# Patient Record
Sex: MALE | Race: WHITE | NOT HISPANIC OR LATINO | Employment: FULL TIME | URBAN - METROPOLITAN AREA
[De-identification: names, ages, dates, MRNs, and addresses within clinical notes are randomized per-mention and may not be internally consistent; named-entity substitution may affect disease eponyms.]

---

## 2017-03-22 ENCOUNTER — TREATMENT (OUTPATIENT)
Dept: PHYSICAL THERAPY | Facility: CLINIC | Age: 37
End: 2017-03-22

## 2017-03-22 ENCOUNTER — TRANSCRIBE ORDERS (OUTPATIENT)
Dept: PHYSICAL THERAPY | Facility: CLINIC | Age: 37
End: 2017-03-22

## 2017-03-22 DIAGNOSIS — M79.671 RIGHT FOOT PAIN: Primary | ICD-10-CM

## 2017-03-22 DIAGNOSIS — M76.61 ACHILLES TENDINITIS OF RIGHT LOWER EXTREMITY: Primary | ICD-10-CM

## 2017-03-22 PROCEDURE — 97140 MANUAL THERAPY 1/> REGIONS: CPT | Performed by: PHYSICAL THERAPIST

## 2017-03-22 PROCEDURE — 97161 PT EVAL LOW COMPLEX 20 MIN: CPT | Performed by: PHYSICAL THERAPIST

## 2017-03-22 PROCEDURE — 97035 APP MDLTY 1+ULTRASOUND EA 15: CPT | Performed by: PHYSICAL THERAPIST

## 2017-03-24 NOTE — PROGRESS NOTES
Physical Therapy Initial Evaluation and Plan of Care    Patient: Campbell Mcintosh   : 1980  Diagnosis/ICD-10 Code:  Achilles tendinitis of right lower extremity [M76.61]  Referring practitioner: Kareem Nice, *  Date of Initial Visit: 3/22/2017        Subjective Evaluation    History of Present Illness  Mechanism of injury: Patient reports that 2 years ago he was diagnosed with a bone spur and achilles tendon tear on his right ankle.  He reports that his MD did not do much treatment and mostly his pain went away. However he reports that as he has increased his exercise intensity with running and biking he has begun to have more pain.  Patient was to return to do marathons and triathalon's however his pain continues to be present.  He reports that he is frustrated since he has not been given an MRI.    PMHx: hx of ankle sprains. REcent radial head fx on left arm in cast    Quality of life: good    Pain  Current pain ratin  At best pain ratin  At worst pain ratin  Location: right achilles  Quality: cramping, pressure, sharp and tight  Relieving factors: rest and support  Aggravating factors: movement  Progression: no change    Diagnostic Tests  X-ray: abnormal (bone spur)    Patient Goals  Patient goals for therapy: decreased pain, increased motion, return to sport/leisure activities and increased strength             Objective     Palpation     Right Tenderness of the lateral gastrocnemius, medial gastrocnemius and soleus.     Additional Palpation Details  Tightness in calf    Tenderness     Right Ankle/Foot   Tenderness in the Achilles insertion.     Active Range of Motion     Right Ankle/Foot   Dorsiflexion (ke): 10 degrees   Plantar flexion: 45 degrees   Inversion: WFL  Eversion: WFL    Strength/Myotome Testing     Right Ankle/Foot   Dorsiflexion: 4+  Plantar flexion: 4+  Inversion: 4+  Eversion: 4+    Tests      Additional Tests Details  Pain with repeated heel raise single leg         Assessment & Plan     Assessment  Impairments: abnormal or restricted ROM, impaired balance, impaired physical strength, lacks appropriate home exercise program and pain with function  Assessment details: Mr. Mcintosh has had a history of ankle instability due to repeated ankle sprains.  However 2 years ago he began to have more issues due to a bone spur and a possible achilles tear. Patient is very active with both work and outside hobbies and his pain has begun to limit him with those activities. He has a lot of calf tightness but good muscle definition.  Upon palpation patient had a lot of scar tissue formation at the achilles tendon and into the calf muscles.  At this time skilled physical is medically required to reduce his pain and increase ankle ROm and strength to return him to all work duties.   Barriers to therapy: chornic nature of condition  Prognosis: good  Prognosis details: Goals  Short Term Goals (4 weeks)  1. Pt to be independent with HEP  2. Pt to exhibit DF AROM by 10-15 degrees to allow for improved pushoff with gait  3. Pt to report decreased pain at end of  day  4. Pt to exhibit 5 strength to 5/5 to allow for prolonged  walking     Long Term Goals (8 weeks)  1. Pt to exhibit 20 degrees of dorsiflexion to allow for normal gait  2. Pt to report min to no pain with  recreational activities  3. Pt to score 75/80 on LEFS        Plan  Therapy options: will be seen for skilled physical therapy services  Planned modality interventions: cryotherapy, electrical stimulation/Barbadian stimulation, ultrasound and thermotherapy (hydrocollator packs)  Planned therapy interventions: therapeutic activities, stretching, strengthening, soft tissue mobilization, neuromuscular re-education, manual therapy, joint mobilization, home exercise program, gait training, functional ROM exercises, flexibility and balance/weight-bearing  training  Frequency: 2x week  Duration in weeks: 8  Treatment plan discussed with: patient          Manual PT [id number] 15 minutes    Timed Treatment:   23   mins   Total Treatment:     53   mins    PT SIGNATURE: Carley Tillman PT   [place] License # [id number]  DATE TREATMENT INITIATED: 3/24/2017    Initial Certification  Certification Period: 6/22/2017  I certify that the therapy services are furnished while this patient is under my care.  The services outlined above are required by this patient, and will be reviewed every 90 days.     PHYSICIAN: Kareem Nice DPM      DATE:     Please sign and return via fax to [phone].. Thank you, [facility].

## 2017-03-28 ENCOUNTER — TREATMENT (OUTPATIENT)
Dept: PHYSICAL THERAPY | Facility: CLINIC | Age: 37
End: 2017-03-28

## 2017-03-28 DIAGNOSIS — M76.61 ACHILLES TENDINITIS OF RIGHT LOWER EXTREMITY: Primary | ICD-10-CM

## 2017-03-28 PROCEDURE — 97140 MANUAL THERAPY 1/> REGIONS: CPT | Performed by: PHYSICAL THERAPIST

## 2017-03-28 PROCEDURE — 97035 APP MDLTY 1+ULTRASOUND EA 15: CPT | Performed by: PHYSICAL THERAPIST

## 2017-03-28 PROCEDURE — 97110 THERAPEUTIC EXERCISES: CPT | Performed by: PHYSICAL THERAPIST

## 2017-03-29 NOTE — PROGRESS NOTES
Physical Therapy Daily Progress Note      Subjective     Campbell Mcintosh reports: that he is feeling ok.  He reports that he wants to remove his cast.     Pain Scale:  2/10    Objective   See Exercise, Manual, and Modality Logs for complete treatment.       Assessment/Plan  Campbell continues to have a lot of tightness in his calf. I explained to him the importance of stretching at home.  I told him not to remove his cast, or make any adjustments to it.     Progress per Plan of Care           Manual PT [id number] 15 minutes and Therapy Exercise [id number] 15 minutes    Timed Treatment:   38   mins   Total Treatment:     38   mins    Carley Tillman, PT  Physical Therapist  [place] License # [id number]

## 2017-03-30 ENCOUNTER — TREATMENT (OUTPATIENT)
Dept: PHYSICAL THERAPY | Facility: CLINIC | Age: 37
End: 2017-03-30

## 2017-03-30 DIAGNOSIS — M76.61 ACHILLES TENDINITIS OF RIGHT LOWER EXTREMITY: Primary | ICD-10-CM

## 2017-03-30 PROCEDURE — 97140 MANUAL THERAPY 1/> REGIONS: CPT | Performed by: PHYSICAL THERAPIST

## 2017-03-30 PROCEDURE — 97110 THERAPEUTIC EXERCISES: CPT | Performed by: PHYSICAL THERAPIST

## 2017-03-31 NOTE — PROGRESS NOTES
Physical Therapy Daily Progress Note      Subjective     Campbell Mcintosh reports: that his calf is doing well. He reports that he ran for 2miles without any pain but reports that usually his pain does not set in until 6 miles but will try that this weekend.     Pain Scale:  0/10    Objective   See Exercise, Manual, and Modality Logs for complete treatment.       Assessment/Plan  Campbell is doing well with physical therapy with improved calf flexibility.  He still has some scar tissue build up in his calf that we are working on. We will reassess next week after his long run.     Progress per Plan of Care           Manual PT [id number] 25 minutes and Therapy Exercise [id number] 15 minutes    Timed Treatment:   40   mins   Total Treatment:    40   mins    Carley Tillman, PT  Physical Therapist  [place] License # [id number]

## 2017-04-04 ENCOUNTER — TREATMENT (OUTPATIENT)
Dept: PHYSICAL THERAPY | Facility: CLINIC | Age: 37
End: 2017-04-04

## 2017-04-04 DIAGNOSIS — M76.61 ACHILLES TENDINITIS OF RIGHT LOWER EXTREMITY: Primary | ICD-10-CM

## 2017-04-04 PROCEDURE — 97035 APP MDLTY 1+ULTRASOUND EA 15: CPT | Performed by: PHYSICAL THERAPIST

## 2017-04-04 PROCEDURE — 97140 MANUAL THERAPY 1/> REGIONS: CPT | Performed by: PHYSICAL THERAPIST

## 2017-04-05 NOTE — PROGRESS NOTES
Physical Therapy Daily Progress Note  Patient has been seen for 4 sessions    Subjective     Campbell Mcintosh reports: that his heel does not bother him unless he is running long distances >6 miles.  He report he ran about 6 on Sunday with no pain.  He has not had the opportunity to run further due to his cast on his arm.    Pain Scale:  0/10    Objective   See Exercise, Manual, and Modality Logs for complete treatment.       Assessment/Plan  Campbell is doing well but is limited with symptom reproduction due to his cast.  I spoke with him about the possibility of holding off on PT until his cast is removed and he has returned to work. Campbell did not want to do any exercise today.    Progress per Plan of Care           Manual PT [id number] 30 minutes    Timed Treatment:   38   mins   Total Treatment:     38  mins    Carley Tillman, PT  Physical Therapist  [place] License # [id number]

## 2017-04-18 ENCOUNTER — TREATMENT (OUTPATIENT)
Dept: PHYSICAL THERAPY | Facility: CLINIC | Age: 37
End: 2017-04-18

## 2017-04-18 DIAGNOSIS — M76.61 ACHILLES TENDINITIS OF RIGHT LOWER EXTREMITY: Primary | ICD-10-CM

## 2017-04-18 PROCEDURE — 97035 APP MDLTY 1+ULTRASOUND EA 15: CPT | Performed by: PHYSICAL THERAPIST

## 2017-04-18 PROCEDURE — 97140 MANUAL THERAPY 1/> REGIONS: CPT | Performed by: PHYSICAL THERAPIST

## 2017-04-18 PROCEDURE — 97110 THERAPEUTIC EXERCISES: CPT | Performed by: PHYSICAL THERAPIST

## 2017-04-19 NOTE — PROGRESS NOTES
Physical Therapy Daily Progress Note  Patient has been seen for 5 sessions    Subjective     Campbell Mcintosh reports: that he ran 5 miles but didn;t have any pain until the next day but it was very mild.    Pain Scale: 1 /10    Objective   See Exercise, Manual, and Modality Logs for complete treatment.       Assessment/Plan  Campbell has been able to return to running however he continues to have some pain after long distances.     Progress per Plan of Care           Manual PT [id number] 15 minutes and Therapy Exercise [id number] 15 minutes    Timed Treatment:   38   mins   Total Treatment:     38  mins    Carley Tillman, PT  Physical Therapist  [place] License # [id number]

## 2017-05-09 ENCOUNTER — OFFICE VISIT (OUTPATIENT)
Dept: FAMILY MEDICINE CLINIC | Facility: CLINIC | Age: 37
End: 2017-05-09

## 2017-05-09 VITALS
SYSTOLIC BLOOD PRESSURE: 122 MMHG | TEMPERATURE: 97.4 F | OXYGEN SATURATION: 98 % | HEIGHT: 69 IN | DIASTOLIC BLOOD PRESSURE: 60 MMHG | HEART RATE: 46 BPM | BODY MASS INDEX: 29.62 KG/M2 | WEIGHT: 200 LBS

## 2017-05-09 DIAGNOSIS — Z00.00 ROUTINE ADULT HEALTH MAINTENANCE: Primary | ICD-10-CM

## 2017-05-09 LAB
ALBUMIN SERPL-MCNC: 4.9 G/DL (ref 3.5–5.2)
ALBUMIN/GLOB SERPL: 1.7 G/DL
ALP SERPL-CCNC: 41 U/L (ref 40–129)
ALT SERPL-CCNC: 33 U/L (ref 5–41)
APPEARANCE UR: CLEAR
AST SERPL-CCNC: 26 U/L (ref 5–40)
BILIRUB SERPL-MCNC: 1.2 MG/DL (ref 0.2–1.2)
BILIRUB UR QL STRIP: NEGATIVE
BUN SERPL-MCNC: 15 MG/DL (ref 6–20)
BUN/CREAT SERPL: 13 (ref 7–25)
CALCIUM SERPL-MCNC: 9.8 MG/DL (ref 8.6–10.5)
CHLORIDE SERPL-SCNC: 98 MMOL/L (ref 98–107)
CHOLEST SERPL-MCNC: 216 MG/DL (ref 0–200)
CO2 SERPL-SCNC: 28 MMOL/L (ref 22–29)
COLOR UR: YELLOW
CREAT SERPL-MCNC: 1.15 MG/DL (ref 0.76–1.27)
ERYTHROCYTE [DISTWIDTH] IN BLOOD BY AUTOMATED COUNT: 12.2 % (ref 11.5–14.5)
GLOBULIN SER CALC-MCNC: 2.9 GM/DL
GLUCOSE SERPL-MCNC: 75 MG/DL (ref 65–99)
GLUCOSE UR QL: NEGATIVE
HCT VFR BLD AUTO: 45.2 % (ref 42–52)
HDLC SERPL-MCNC: 44 MG/DL (ref 40–60)
HGB BLD-MCNC: 15.5 G/DL (ref 14–18)
HGB UR QL STRIP: NEGATIVE
KETONES UR QL STRIP: NEGATIVE
LDLC SERPL CALC-MCNC: 134 MG/DL (ref 0–100)
LEUKOCYTE ESTERASE UR QL STRIP: NEGATIVE
MCH RBC QN AUTO: 29.4 PG (ref 27–31)
MCHC RBC AUTO-ENTMCNC: 34.3 G/DL (ref 31–37)
MCV RBC AUTO: 85.6 FL (ref 80–94)
NITRITE UR QL STRIP: NEGATIVE
PH UR STRIP: 7 [PH] (ref 4.5–8)
PLATELET # BLD AUTO: 197 10*3/MM3 (ref 140–500)
POTASSIUM SERPL-SCNC: 4.3 MMOL/L (ref 3.5–5.2)
PROT SERPL-MCNC: 7.8 G/DL (ref 6–8.5)
PROT UR QL STRIP: NEGATIVE
RBC # BLD AUTO: 5.28 10*6/MM3 (ref 4.7–6.1)
SODIUM SERPL-SCNC: 140 MMOL/L (ref 136–145)
SP GR UR: 1.01 (ref 1–1.03)
TRIGL SERPL-MCNC: 191 MG/DL (ref 0–150)
UROBILINOGEN UR STRIP-MCNC: NORMAL MG/DL
VLDLC SERPL CALC-MCNC: 38.2 MG/DL (ref 8–32)
WBC # BLD AUTO: 5.22 10*3/MM3 (ref 4.8–10.8)

## 2017-05-09 PROCEDURE — 99385 PREV VISIT NEW AGE 18-39: CPT | Performed by: FAMILY MEDICINE

## 2017-05-09 RX ORDER — MULTIVIT-MIN/FOLIC/VIT K/LYCOP 400-300MCG
TABLET ORAL
COMMUNITY
End: 2022-12-06

## 2017-05-30 ENCOUNTER — DOCUMENTATION (OUTPATIENT)
Dept: PHYSICAL THERAPY | Facility: CLINIC | Age: 37
End: 2017-05-30

## 2017-07-06 ENCOUNTER — OFFICE VISIT (OUTPATIENT)
Dept: FAMILY MEDICINE CLINIC | Facility: CLINIC | Age: 37
End: 2017-07-06

## 2017-07-06 VITALS
TEMPERATURE: 97.9 F | OXYGEN SATURATION: 99 % | DIASTOLIC BLOOD PRESSURE: 70 MMHG | WEIGHT: 194.3 LBS | BODY MASS INDEX: 28.78 KG/M2 | SYSTOLIC BLOOD PRESSURE: 110 MMHG | HEART RATE: 48 BPM | HEIGHT: 69 IN

## 2017-07-06 DIAGNOSIS — M77.02 GOLFERS ELBOW OF LEFT UPPER EXTREMITY: ICD-10-CM

## 2017-07-06 DIAGNOSIS — G47.61 PERIODIC LIMB MOVEMENT: Primary | ICD-10-CM

## 2017-07-06 PROCEDURE — 99213 OFFICE O/P EST LOW 20 MIN: CPT | Performed by: FAMILY MEDICINE

## 2017-07-06 RX ORDER — ROPINIROLE 1 MG/1
1 TABLET, FILM COATED ORAL NIGHTLY
Qty: 30 TABLET | Refills: 5 | Status: SHIPPED | OUTPATIENT
Start: 2017-07-06 | End: 2018-03-09

## 2017-07-06 NOTE — PROGRESS NOTES
"  Subjective   Campbell Mcintosh is a 36 y.o. male who is here for   Chief Complaint   Patient presents with   • Insomnia     getting worse x 1 week    • Fatigue   • Arm Pain     left arm, getting worse past couple of months    .     History of Present Illness   Elbow Pain: Patient complains of left elbow pain. Onset of the symptoms was several years ago. Inciting event: increased use of arm, very active. Current symptoms include point tenderness medial epicondyle. Pain is aggravated by: grasping, lifting heavy objects, supination/pronation as when opening doors. Patient's overall course: symptoms have progressed to a point and plateaued. Patient has had prior elbow problems. Evaluation to date: none. Treatment to date: avoidance of offending activity.      Also poor sleeper  Had a sleep study which reported on MAYRA but \"my arms and legs jerk all around\"  Only gets 4-6 h of sleep a day    The following portions of the patient's history were reviewed and updated as appropriate: allergies, current medications, past family history, past medical history, past social history, past surgical history and problem list.    Review of Systems    Objective   Physical Exam   Constitutional: He appears well-developed and well-nourished.   Cardiovascular: Normal rate.    Pulmonary/Chest: Effort normal.   Musculoskeletal:        Left elbow: He exhibits normal range of motion, no swelling, no effusion and no deformity. Tenderness found. Medial epicondyle tenderness noted. No radial head, no lateral epicondyle and no olecranon process tenderness noted.   Neurological: He is alert.   Psychiatric: He has a normal mood and affect.   Nursing note and vitals reviewed.      Assessment/Plan   Campbell was seen today for insomnia, fatigue and arm pain.    Diagnoses and all orders for this visit:    Periodic limb movement  -     rOPINIRole (REQUIP) 1 MG tablet; Take 1 tablet by mouth Every Night. Take 1 hour before bedtime.  Indications: " Restless Leg Syndrome    Golfers elbow of left upper extremity      Patient Instructions   Golfers elbow HO given      There are no discontinued medications.     Return if symptoms worsen or fail to improve.    Dr. Freddy Talley  [place], [place]

## 2017-10-02 ENCOUNTER — OFFICE VISIT (OUTPATIENT)
Dept: FAMILY MEDICINE CLINIC | Facility: CLINIC | Age: 37
End: 2017-10-02

## 2017-10-02 VITALS
DIASTOLIC BLOOD PRESSURE: 79 MMHG | HEIGHT: 70 IN | RESPIRATION RATE: 18 BRPM | OXYGEN SATURATION: 97 % | SYSTOLIC BLOOD PRESSURE: 114 MMHG | HEART RATE: 84 BPM | BODY MASS INDEX: 26.2 KG/M2 | WEIGHT: 183 LBS

## 2017-10-02 DIAGNOSIS — R19.7 DIARRHEA OF PRESUMED INFECTIOUS ORIGIN: ICD-10-CM

## 2017-10-02 DIAGNOSIS — R11.0 NAUSEA: Primary | ICD-10-CM

## 2017-10-02 PROCEDURE — 99213 OFFICE O/P EST LOW 20 MIN: CPT | Performed by: NURSE PRACTITIONER

## 2017-10-02 RX ORDER — ONDANSETRON 4 MG/1
4 TABLET, ORALLY DISINTEGRATING ORAL EVERY 8 HOURS PRN
Qty: 20 TABLET | Refills: 0 | Status: SHIPPED | OUTPATIENT
Start: 2017-10-02 | End: 2020-08-28

## 2017-10-02 NOTE — PROGRESS NOTES
Subjective   Campbell Mcintosh is a 36 y.o. male.   Chief Complaint   Patient presents with   • Nausea     fever cold chills, vomiting, diarrhea started x 1 day ago     Vitals:    10/02/17 0953   BP: 114/79   Pulse: 84   Resp: 18   SpO2: 97%     Allergies   Allergen Reactions   • Percocet [Oxycodone-Acetaminophen] Nausea And Vomiting       Nausea   This is a new problem. The current episode started in the past 7 days. The problem occurs 2 to 4 times per day. The problem has been gradually improving. Associated symptoms include abdominal pain, chills, fatigue, nausea and vomiting. Pertinent negatives include no fever or sore throat. Associated symptoms comments: Diarrhea, body aches    . Nothing aggravates the symptoms. He has tried nothing for the symptoms.        The following portions of the patient's history were reviewed and updated as appropriate: allergies, current medications, past family history, past medical history, past social history, past surgical history and problem list.    Review of Systems   Constitutional: Positive for chills and fatigue. Negative for fever.   HENT: Negative for sore throat.    Respiratory: Negative.    Cardiovascular: Negative.    Gastrointestinal: Positive for abdominal pain, anal bleeding, diarrhea, nausea and vomiting. Negative for abdominal distention, blood in stool and constipation.   Skin: Negative.    Neurological: Negative.    Psychiatric/Behavioral: Negative.    All other systems reviewed and are negative.      Objective   Physical Exam   Constitutional: He is oriented to person, place, and time. He appears well-developed and well-nourished.   Cardiovascular: Normal rate.    Pulmonary/Chest: Effort normal.   Abdominal: Soft. Bowel sounds are increased. There is no hepatosplenomegaly. There is tenderness in the right lower quadrant, left upper quadrant and left lower quadrant.   Neurological: He is alert and oriented to person, place, and time.   Psychiatric: He has a  normal mood and affect. His behavior is normal. Judgment and thought content normal.   Nursing note and vitals reviewed.      Assessment/Plan   Problems Addressed this Visit     None      Visit Diagnoses     Nausea    -  Primary    Relevant Medications    ondansetron ODT (ZOFRAN ODT) 4 MG disintegrating tablet    Diarrhea of presumed infectious origin            Also  some Imodium AD. Use BRAT diet to reintroduce foods. Avoid dairy, greasy foods, and acidic foods for a few days.

## 2018-03-09 ENCOUNTER — OFFICE VISIT (OUTPATIENT)
Dept: FAMILY MEDICINE CLINIC | Facility: CLINIC | Age: 38
End: 2018-03-09

## 2018-03-09 VITALS
BODY MASS INDEX: 28.59 KG/M2 | HEIGHT: 70 IN | OXYGEN SATURATION: 98 % | WEIGHT: 199.7 LBS | SYSTOLIC BLOOD PRESSURE: 122 MMHG | DIASTOLIC BLOOD PRESSURE: 78 MMHG | HEART RATE: 70 BPM | TEMPERATURE: 98.2 F

## 2018-03-09 DIAGNOSIS — S81.811D LACERATION OF RIGHT LOWER LEG, SUBSEQUENT ENCOUNTER: Primary | ICD-10-CM

## 2018-03-09 PROBLEM — Z48.02 ENCOUNTER FOR REMOVAL OF SUTURES: Status: ACTIVE | Noted: 2018-03-09

## 2018-03-09 PROBLEM — S81.811A LACERATION OF RIGHT LOWER LEG: Status: ACTIVE | Noted: 2018-03-09

## 2018-03-09 PROCEDURE — 99213 OFFICE O/P EST LOW 20 MIN: CPT | Performed by: FAMILY MEDICINE

## 2018-03-09 NOTE — PROGRESS NOTES
Subjective   Campbell Mcintosh is a 37 y.o. male who is here for   Chief Complaint   Patient presents with   • Follow-up     ER   • Fall     knee pain    .     History of Present Illness   Lac to knee  2 layer closure at ER  Notes reviewed.    Knee joint is sore and swollen as well.    Fell and hit knee, upper shin.    TD is utd    The following portions of the patient's history were reviewed and updated as appropriate: allergies, current medications, past family history, past medical history, past social history, past surgical history and problem list.    Review of Systems    Objective   Physical Exam   Cardiovascular: Normal rate and intact distal pulses.    Musculoskeletal:        Legs:  Nursing note and vitals reviewed.      Assessment/Plan   Campbell was seen today for follow-up and fall.    Diagnoses and all orders for this visit:    Laceration of right lower leg, subsequent encounter      There are no Patient Instructions on file for this visit.    Medications Discontinued During This Encounter   Medication Reason   • rOPINIRole (REQUIP) 1 MG tablet *Therapy completed        Return in about 7 days (around 3/16/2018) for wound/suture check.    Dr. Freddy Talley  [facility] [place], [place]

## 2018-03-16 ENCOUNTER — PROCEDURE VISIT (OUTPATIENT)
Dept: FAMILY MEDICINE CLINIC | Facility: CLINIC | Age: 38
End: 2018-03-16

## 2018-03-16 VITALS
DIASTOLIC BLOOD PRESSURE: 90 MMHG | BODY MASS INDEX: 28.18 KG/M2 | SYSTOLIC BLOOD PRESSURE: 128 MMHG | WEIGHT: 196.8 LBS | HEIGHT: 70 IN | OXYGEN SATURATION: 98 % | HEART RATE: 75 BPM

## 2018-03-16 DIAGNOSIS — Z48.02 ENCOUNTER FOR REMOVAL OF SUTURES: ICD-10-CM

## 2018-03-16 DIAGNOSIS — S81.811S LACERATION OF RIGHT LOWER LEG, SEQUELA: Primary | ICD-10-CM

## 2018-03-16 PROCEDURE — 99212 OFFICE O/P EST SF 10 MIN: CPT | Performed by: FAMILY MEDICINE

## 2018-03-16 NOTE — PROGRESS NOTES
Subjective   Campbell Mcintosh is a 37 y.o. male who is here for   Chief Complaint   Patient presents with   • Suture / Staple Removal   .     History of Present Illness   Returns for right shin suture removal.  Would looks clean.      The following portions of the patient's history were reviewed and updated as appropriate: allergies, current medications, past medical history, past social history and problem list.    Review of Systems    Objective   Physical Exam   Skin:        Nursing note and vitals reviewed.          Assessment/Plan   Campbell was seen today for suture / staple removal.    Diagnoses and all orders for this visit:    Laceration of right lower leg, sequela    Encounter for removal of sutures      Patient Instructions   Lets try again next week        There are no discontinued medications.     Return in about 7 days (around 3/23/2018) for wound/suture check.    Dr. Freddy Talley  [facility]  [place], [place]

## 2018-03-23 ENCOUNTER — OFFICE VISIT (OUTPATIENT)
Dept: FAMILY MEDICINE CLINIC | Facility: CLINIC | Age: 38
End: 2018-03-23

## 2018-03-23 VITALS
WEIGHT: 196.8 LBS | OXYGEN SATURATION: 97 % | BODY MASS INDEX: 28.18 KG/M2 | HEIGHT: 70 IN | SYSTOLIC BLOOD PRESSURE: 130 MMHG | HEART RATE: 63 BPM | DIASTOLIC BLOOD PRESSURE: 82 MMHG

## 2018-03-23 DIAGNOSIS — Z48.02 ENCOUNTER FOR REMOVAL OF SUTURES: Primary | ICD-10-CM

## 2018-03-23 DIAGNOSIS — S81.811S LACERATION OF RIGHT LOWER LEG, SEQUELA: ICD-10-CM

## 2018-03-23 PROCEDURE — 99024 POSTOP FOLLOW-UP VISIT: CPT | Performed by: FAMILY MEDICINE

## 2018-03-26 NOTE — PROGRESS NOTES
Procedure   Suture Removal  Date/Time: 3/26/2018 10:01 AM  Performed by: RADHA LICONA  Authorized by: RADHA LICONA   Consent: Verbal consent obtained.  Risks and benefits: risks, benefits and alternatives were discussed  Consent given by: patient  Patient understanding: patient states understanding of the procedure being performed  Patient identity confirmed: verbally with patient  Body area: lower extremity  Location details: right lower leg  Wound Appearance: clean, pink and tender  Sutures Removed: 5  Post-removal: dressing applied  Patient tolerance: Patient tolerated the procedure well with no immediate complications  Comments: We able to remove the interpreted simple sutures along the top  But would is still not healed well and will easily re open  So i left the 2 deeper mattress horizontals in place.  Try next week.

## 2018-03-30 ENCOUNTER — PROCEDURE VISIT (OUTPATIENT)
Dept: FAMILY MEDICINE CLINIC | Facility: CLINIC | Age: 38
End: 2018-03-30

## 2018-03-30 VITALS
DIASTOLIC BLOOD PRESSURE: 94 MMHG | BODY MASS INDEX: 28.06 KG/M2 | OXYGEN SATURATION: 97 % | SYSTOLIC BLOOD PRESSURE: 128 MMHG | HEIGHT: 70 IN | HEART RATE: 54 BPM | WEIGHT: 196 LBS

## 2018-03-30 DIAGNOSIS — S81.811S LACERATION OF RIGHT LOWER LEG, SEQUELA: ICD-10-CM

## 2018-03-30 DIAGNOSIS — Z48.02 ENCOUNTER FOR REMOVAL OF SUTURES: Primary | ICD-10-CM

## 2018-03-30 PROCEDURE — 99212 OFFICE O/P EST SF 10 MIN: CPT | Performed by: FAMILY MEDICINE

## 2018-03-30 NOTE — PROGRESS NOTES
Procedure   Suture Removal  Date/Time: 3/30/2018 2:39 PM  Performed by: RADHA LICONA  Authorized by: RADHA LICONA   Consent: Verbal consent obtained. Written consent not obtained.  Risks and benefits: risks, benefits and alternatives were discussed  Consent given by: patient  Patient understanding: patient states understanding of the procedure being performed  Patient identity confirmed: verbally with patient  Body area: lower extremity  Location details: right lower leg  Wound Appearance: clean  Sutures Removed: 2  Patient tolerance: Patient tolerated the procedure well with no immediate complications  Comments: Josh comes in for a scheduled suture removal  We removed the interupted sutures last week but wound was healed so we left the mattress sutures in x2  Looks much more healed today , so i removed the last 2  Wound stayed together.

## 2019-02-26 ENCOUNTER — OFFICE VISIT (OUTPATIENT)
Dept: FAMILY MEDICINE CLINIC | Facility: CLINIC | Age: 39
End: 2019-02-26

## 2019-02-26 VITALS
HEIGHT: 70 IN | HEART RATE: 77 BPM | SYSTOLIC BLOOD PRESSURE: 118 MMHG | DIASTOLIC BLOOD PRESSURE: 82 MMHG | WEIGHT: 200 LBS | OXYGEN SATURATION: 98 % | BODY MASS INDEX: 28.63 KG/M2

## 2019-02-26 DIAGNOSIS — F51.01 PRIMARY INSOMNIA: ICD-10-CM

## 2019-02-26 DIAGNOSIS — G47.61 PERIODIC LIMB MOVEMENT: Primary | ICD-10-CM

## 2019-02-26 PROBLEM — S81.811A LACERATION OF RIGHT LOWER LEG: Status: RESOLVED | Noted: 2018-03-09 | Resolved: 2019-02-26

## 2019-02-26 PROBLEM — Z48.02 ENCOUNTER FOR REMOVAL OF SUTURES: Status: RESOLVED | Noted: 2018-03-09 | Resolved: 2019-02-26

## 2019-02-26 PROCEDURE — 99213 OFFICE O/P EST LOW 20 MIN: CPT | Performed by: FAMILY MEDICINE

## 2019-02-26 NOTE — PROGRESS NOTES
"  Chief Complaint   Patient presents with   • Nasal Congestion     x 1 week    • Chest Tightness   • Fatigue   • Sore Throat   • Nausea     x 1 day        Upper Respiratory Infection: Patient complains of symptoms of a URI. Symptoms include congestion and cough. Onset of symptoms was a few weeks ago, gradually improving since that time. He also c/o non productive cough for the past 1 week .  He is drinking plenty of fluids. Evaluation to date: none. Treatment to date: cough suppressants.  Ill contacts at home or school or work discussed.  Both wife and daughter had same 2 weeks ago  But overall today his cold is nearly resolved  But still wanted to come in and talk about chronic poor sleep    Poor sleeper since a child  Light sleeper  awakes numerous times a night  un rested in the am  No problem falling asleep.          Vitals:    02/26/19 1334   BP: 118/82   BP Location: Left arm   Patient Position: Sitting   Pulse: 77   SpO2: 98%   Weight: 90.7 kg (200 lb)   Height: 177.8 cm (70\")     Gen: well appearing, alert  Ears: Tm's bulging without redness  Nose:  Congestion  Throat:  without exudate, some drainage, tonsils okay  Neck: no LAD  Lung:  good air movement, regular RR  Heart: RR without murmur  Skin: no rash.      Assessment/Plan   Campbell was seen today for nasal congestion, chest tightness, fatigue, sore throat and nausea.    Diagnoses and all orders for this visit:    Periodic limb movement  -     Suvorexant (BELSOMRA) 10 MG tablet; Take 10 mg by mouth every night at bedtime.  -     Suvorexant (BELSOMRA) 20 MG tablet; Take 20 mg by mouth every night at bedtime.    Primary insomnia  -     Suvorexant (BELSOMRA) 10 MG tablet; Take 10 mg by mouth every night at bedtime.  -     Suvorexant (BELSOMRA) 20 MG tablet; Take 20 mg by mouth every night at bedtime.             There are no Patient Instructions on file for this visit.    Dr. Freddy Talley MD  [place], [place]  [facility] " Group

## 2019-05-10 ENCOUNTER — OFFICE VISIT (OUTPATIENT)
Dept: FAMILY MEDICINE CLINIC | Facility: CLINIC | Age: 39
End: 2019-05-10

## 2019-05-10 VITALS
DIASTOLIC BLOOD PRESSURE: 96 MMHG | SYSTOLIC BLOOD PRESSURE: 142 MMHG | BODY MASS INDEX: 28.77 KG/M2 | HEART RATE: 64 BPM | HEIGHT: 70 IN | OXYGEN SATURATION: 96 % | WEIGHT: 201 LBS

## 2019-05-10 DIAGNOSIS — G47.61 PERIODIC LIMB MOVEMENT: Primary | ICD-10-CM

## 2019-05-10 DIAGNOSIS — F51.01 PRIMARY INSOMNIA: ICD-10-CM

## 2019-05-10 PROCEDURE — 99213 OFFICE O/P EST LOW 20 MIN: CPT | Performed by: FAMILY MEDICINE

## 2019-05-10 NOTE — PROGRESS NOTES
Subjective   Campbell Mcintosh is a 38 y.o. male who is here for   Chief Complaint   Patient presents with   • Insomnia     Follow Up  did not care for Belsomra   .     History of Present Illness   Tried the Belsomra  Worked ok  But he does not have 8 hours to sleep  Goes to bed at 11 pm wakes up at 4 am.  Had am hangover    The following portions of the patient's history were reviewed and updated as appropriate: allergies, current medications, past family history, past medical history, past social history and problem list.    Review of Systems    Objective   Physical Exam   Constitutional: He appears well-developed and well-nourished.   Pulmonary/Chest: Effort normal.   Neurological: He is alert.   Psychiatric: He has a normal mood and affect.   Nursing note and vitals reviewed.      Assessment/Plan   Campbell was seen today for insomnia.    Diagnoses and all orders for this visit:    Periodic limb movement    Primary insomnia      There are no Patient Instructions on file for this visit.    Medications Discontinued During This Encounter   Medication Reason   • Suvorexant (BELSOMRA) 10 MG tablet Not Efficacious   • Suvorexant (BELSOMRA) 20 MG tablet Not Efficacious        Return in about 1 year (around 5/10/2020) for Annual physical.    Dr. Freddy Talley  [place], [place]

## 2020-05-15 ENCOUNTER — TELEMEDICINE (OUTPATIENT)
Dept: FAMILY MEDICINE CLINIC | Facility: CLINIC | Age: 40
End: 2020-05-15

## 2020-05-15 DIAGNOSIS — R53.82 CHRONIC FATIGUE: Primary | ICD-10-CM

## 2020-05-15 DIAGNOSIS — E55.9 VITAMIN D INSUFFICIENCY: ICD-10-CM

## 2020-05-15 DIAGNOSIS — R11.0 NAUSEA: ICD-10-CM

## 2020-05-15 DIAGNOSIS — Z00.00 ROUTINE ADULT HEALTH MAINTENANCE: ICD-10-CM

## 2020-05-15 PROCEDURE — 99213 OFFICE O/P EST LOW 20 MIN: CPT | Performed by: FAMILY MEDICINE

## 2020-05-15 NOTE — PROGRESS NOTES
Subjective   Campbell Mcintosh is a 39 y.o. male who is here for   Chief Complaint   Patient presents with   • Fatigue   .     History of Present Illness   This is a Mychart Video medical encounter, occurring during the coronavirus COVID-19 pandemic of 2020.  Medical history from chart is reviewed.  Total face video time: 9. Total chart time:15    Fatigue: Patient complains of fatigue. Symptoms began several months ago. Sentinal symptom the patient feels fatigue began with: none. Symptoms of his fatigue have been diffuse soft tissue aches and pains, fatigue with paradoxical insomnia, headaches and lack of interest in usual activities. Patient describes the following psychologic symptoms: stress at work.  Patient denies fever, significant change in weight, symptoms of arthritis, exercise intolerance, unusual rashes, cold intolerance, constipation and change in hair texture., GI blood loss and witnessed or suspected sleep apnea. Symptoms have progressed to a point and plateaued. Severity has been symptoms bothersome, but easily able to carry out all usual work/school/family activities. Previous visits for this problem: none.   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    Would like to add on allergy panel to upcoming routine lab work  Due to seasonal nasal congestion and stomach ache after certain foods        The following portions of the patient's history were reviewed and updated as appropriate: allergies, current medications, past family history, past medical history, past social history, past surgical history and problem list.    Review of Systems   Constitutional: Negative for fever.   Respiratory: Negative for shortness of breath.    Cardiovascular: Negative for chest pain.   Gastrointestinal: Negative.    Psychiatric/Behavioral: Positive for sleep disturbance.       Objective   Physical Exam   Constitutional: No distress.   Pulmonary/Chest: No respiratory distress.   Neurological: He is alert.    Psychiatric: He has a normal mood and affect.       Assessment/Plan   Campbell was seen today for fatigue.    Diagnoses and all orders for this visit:    Chronic fatigue  -     Allergens, Zone 5; Future  -     CBC (No Diff); Future  -     Comprehensive Metabolic Panel; Future  -     Lipid Panel; Future  -     TSH; Future  -     Urinalysis With Microscopic If Indicated (No Culture) - Urine, Clean Catch; Future  -     Vitamin D 25 Hydroxy; Future  -     Allergen Food Panel; Future    Nausea  -     Comprehensive Metabolic Panel; Future  -     Allergen Food Panel; Future    Routine adult health maintenance  -     CBC (No Diff); Future  -     Comprehensive Metabolic Panel; Future  -     Lipid Panel; Future  -     TSH; Future  -     Urinalysis With Microscopic If Indicated (No Culture) - Urine, Clean Catch; Future  -     Vitamin D 25 Hydroxy; Future    Vitamin D insufficiency  -     Vitamin D 25 Hydroxy; Future      There are no Patient Instructions on file for this visit.    There are no discontinued medications.     No follow-ups on file.    Dr. Freddy Talley  [place], [place]

## 2020-05-22 ENCOUNTER — RESULTS ENCOUNTER (OUTPATIENT)
Dept: FAMILY MEDICINE CLINIC | Facility: CLINIC | Age: 40
End: 2020-05-22

## 2020-05-22 DIAGNOSIS — Z00.00 ROUTINE ADULT HEALTH MAINTENANCE: ICD-10-CM

## 2020-05-22 DIAGNOSIS — E55.9 VITAMIN D INSUFFICIENCY: ICD-10-CM

## 2020-05-22 DIAGNOSIS — R11.0 NAUSEA: ICD-10-CM

## 2020-05-22 DIAGNOSIS — R53.82 CHRONIC FATIGUE: ICD-10-CM

## 2020-05-28 ENCOUNTER — HOSPITAL ENCOUNTER (EMERGENCY)
Facility: HOSPITAL | Age: 40
Discharge: HOME | End: 2020-05-28
Attending: EMERGENCY MEDICINE | Admitting: EMERGENCY MEDICINE

## 2020-05-28 VITALS
RESPIRATION RATE: 18 BRPM | BODY MASS INDEX: 27.92 KG/M2 | HEIGHT: 70 IN | OXYGEN SATURATION: 99 % | HEART RATE: 67 BPM | SYSTOLIC BLOOD PRESSURE: 137 MMHG | WEIGHT: 195 LBS | TEMPERATURE: 97.8 F | DIASTOLIC BLOOD PRESSURE: 99 MMHG

## 2020-05-28 DIAGNOSIS — S00.05XA FOREIGN BODY OF SKIN OF SCALP, INITIAL ENCOUNTER: Primary | ICD-10-CM

## 2020-05-28 PROCEDURE — 10120 INC&RMVL FB SUBQ TISS SMPL: CPT | Performed by: PHYSICIAN ASSISTANT

## 2020-05-28 PROCEDURE — 99282 EMERGENCY DEPT VISIT SF MDM: CPT

## 2020-05-28 RX ORDER — LIDOCAINE HYDROCHLORIDE AND EPINEPHRINE 10; 10 MG/ML; UG/ML
10 INJECTION, SOLUTION INFILTRATION; PERINEURAL ONCE
Status: COMPLETED | OUTPATIENT
Start: 2020-05-28 | End: 2020-05-28

## 2020-05-28 RX ADMIN — LIDOCAINE HYDROCHLORIDE,EPINEPHRINE BITARTRATE 10 ML: 10; .01 INJECTION, SOLUTION INFILTRATION; PERINEURAL at 17:17

## 2020-08-03 ENCOUNTER — TELEPHONE (OUTPATIENT)
Dept: FAMILY MEDICINE CLINIC | Facility: CLINIC | Age: 40
End: 2020-08-03

## 2020-08-10 LAB
25(OH)D3+25(OH)D2 SERPL-MCNC: 36.9 NG/ML (ref 30–100)
A ALTERNATA IGE QN: <0.1 KU/L
A FUMIGATUS IGE QN: <0.1 KU/L
ALBUMIN SERPL-MCNC: 5 G/DL (ref 3.5–5.2)
ALBUMIN/GLOB SERPL: 2.6 G/DL
ALP SERPL-CCNC: 41 U/L (ref 39–117)
ALT SERPL-CCNC: 25 U/L (ref 1–41)
AMER ROACH IGE QN: <0.1 KU/L
APPEARANCE UR: CLEAR
AST SERPL-CCNC: 21 U/L (ref 1–40)
BAHIA GRASS IGE QN: <0.1 KU/L
BARLEY IGE QN: <0.1 KU/L
BAYBERRY POLN IGE QN: <0.1 KU/L
BEEF IGE QN: <0.1 KU/L
BERMUDA GRASS IGE QN: <0.1 KU/L
BILIRUB SERPL-MCNC: 1 MG/DL (ref 0–1.2)
BILIRUB UR QL STRIP: NEGATIVE
BOXELDER IGE QN: <0.1 KU/L
BUN SERPL-MCNC: 18 MG/DL (ref 6–20)
BUN/CREAT SERPL: 15.7 (ref 7–25)
C HERBARUM IGE QN: <0.1 KU/L
CABBAGE IGE QN: <0.1 KU/L
CALCIUM SERPL-MCNC: 9.2 MG/DL (ref 8.6–10.5)
CARROT IGE QN: <0.1 KU/L
CAT DANDER IGE QN: <0.1 KU/L
CHICKEN MEAT IGE QN: <0.1 KU/L
CHLORIDE SERPL-SCNC: 103 MMOL/L (ref 98–107)
CHOLEST SERPL-MCNC: 189 MG/DL (ref 0–200)
CO2 SERPL-SCNC: 26.7 MMOL/L (ref 22–29)
CODFISH IGE QN: <0.1 KU/L
COLOR UR: YELLOW
COMMON RAGWEED IGE QN: <0.1 KU/L
CONV CLASS DESCRIPTION: NORMAL
CORN IGE QN: <0.1 KU/L
COW MILK IGE QN: <0.1 KU/L
CRAB IGE QN: <0.1 KU/L
CREAT SERPL-MCNC: 1.15 MG/DL (ref 0.76–1.27)
D FARINAE IGE QN: <0.1 KU/L
D PTERONYSS IGE QN: <0.1 KU/L
DOG DANDER IGE QN: <0.1 KU/L
DOG FENNEL IGE QN: <0.1 KU/L
EGG WHITE IGE QN: <0.1 KU/L
ENGL PLANTAIN IGE QN: <0.1 KU/L
ERYTHROCYTE [DISTWIDTH] IN BLOOD BY AUTOMATED COUNT: 12.9 % (ref 12.3–15.4)
GLOBULIN SER CALC-MCNC: 1.9 GM/DL
GLUCOSE SERPL-MCNC: 88 MG/DL (ref 65–99)
GLUCOSE UR QL: NEGATIVE
GOOSEFOOT IGE QN: <0.1 KU/L
GRAPE IGE QN: <0.1 KU/L
GREEN PEPPER IGE QN: <0.1 KU/L
GUM-TREE IGE QN: <0.1 KU/L
HCT VFR BLD AUTO: 44.2 % (ref 37.5–51)
HDLC SERPL-MCNC: 43 MG/DL (ref 40–60)
HGB BLD-MCNC: 14.7 G/DL (ref 13–17.7)
HGB UR QL STRIP: NEGATIVE
ITALIAN CYPRESS IGE QN: <0.1 KU/L
JOHNSON GRASS IGE QN: <0.1 KU/L
KETONES UR QL STRIP: NEGATIVE
LDLC SERPL CALC-MCNC: 121 MG/DL (ref 0–100)
LETTUCE IGE QN: <0.1 KU/L
LEUKOCYTE ESTERASE UR QL STRIP: NEGATIVE
M RACEMOSUS IGE QN: <0.1 KU/L
MCH RBC QN AUTO: 30.2 PG (ref 26.6–33)
MCHC RBC AUTO-ENTMCNC: 33.3 G/DL (ref 31.5–35.7)
MCV RBC AUTO: 90.9 FL (ref 79–97)
NITRITE UR QL STRIP: NEGATIVE
OAT IGE QN: <0.1 KU/L
ORANGE IGE QN: <0.1 KU/L
P NOTATUM IGE QN: <0.1 KU/L
PEANUT IGE QN: <0.1 KU/L
PEPPER TREE IGE QN: <0.1 KU/L
PER RYE GRASS IGE QN: <0.1 KU/L
PH UR STRIP: 6 [PH] (ref 5–8)
PLATELET # BLD AUTO: 168 10*3/MM3 (ref 140–450)
PORK IGE QN: <0.1 KU/L
POTASSIUM SERPL-SCNC: 4.1 MMOL/L (ref 3.5–5.2)
POTATO IGE QN: <0.1 KU/L
PRIVET IGE QN: <0.1 KU/L
PROT SERPL-MCNC: 6.9 G/DL (ref 6–8.5)
PROT UR QL STRIP: NEGATIVE
QUEEN PALM IGE QN: <0.1 KU/L
RBC # BLD AUTO: 4.86 10*6/MM3 (ref 4.14–5.8)
RICE IGE QN: <0.1 KU/L
RYE IGE QN: <0.1 KU/L
S BOTRYOSUM IGE QN: <0.1 KU/L
SHEEP SORREL IGE QN: <0.1 KU/L
SHRIMP IGE QN: <0.1 KU/L
SODIUM SERPL-SCNC: 140 MMOL/L (ref 136–145)
SOYBEAN IGE QN: <0.1 KU/L
SP GR UR: 1.02 (ref 1–1.03)
TOMATO IGE QN: <0.1 KU/L
TRIGL SERPL-MCNC: 124 MG/DL (ref 0–150)
TSH SERPL DL<=0.005 MIU/L-ACNC: 1.61 UIU/ML (ref 0.27–4.2)
TUNA IGE QN: <0.1 KU/L
UROBILINOGEN UR STRIP-MCNC: NORMAL MG/DL
VIRG LIVE OAK IGE QN: <0.1 KU/L
VLDLC SERPL CALC-MCNC: 24.8 MG/DL
WBC # BLD AUTO: 4.05 10*3/MM3 (ref 3.4–10.8)
WHEAT IGE QN: <0.1 KU/L
WHITE BEAN IGE QN: <0.1 KU/L
WHITE ELM IGE QN: <0.1 KU/L

## 2020-08-28 ENCOUNTER — OFFICE VISIT (OUTPATIENT)
Dept: FAMILY MEDICINE CLINIC | Facility: CLINIC | Age: 40
End: 2020-08-28

## 2020-08-28 VITALS
DIASTOLIC BLOOD PRESSURE: 72 MMHG | OXYGEN SATURATION: 99 % | HEIGHT: 70 IN | WEIGHT: 202 LBS | BODY MASS INDEX: 28.92 KG/M2 | HEART RATE: 60 BPM | RESPIRATION RATE: 16 BRPM | SYSTOLIC BLOOD PRESSURE: 120 MMHG

## 2020-08-28 DIAGNOSIS — Z00.00 ROUTINE ADULT HEALTH MAINTENANCE: Primary | ICD-10-CM

## 2020-08-28 PROCEDURE — 99395 PREV VISIT EST AGE 18-39: CPT | Performed by: FAMILY MEDICINE

## 2020-08-28 RX ORDER — BIOTIN 5 MG
1 TABLET ORAL EVERY MORNING
COMMUNITY
End: 2022-12-06

## 2020-08-28 NOTE — PROGRESS NOTES
"  Chief Complaint   Patient presents with   • Annual Exam       Subjective   Campbell Mcintosh is a 39 y.o. male and is here for a yearly physical exam. The patient reports no problems.    Do you take any herbs or supplements that were not prescribed by a doctor? yes. If so, these will be added to active medication list.    The following portions of the patient's history were reviewed and updated as appropriate: allergies, current medications, past family history, past medical history, past social history, past surgical history and problem list.    Social and Family and Surgical History reviewed and updated today, see Rooming tab.    Health History, Preventive Measures and Vaccination flow sheets reviewed and updated today.    Patient's current medical chart in Epic; including previous office notes, Mychart messages, recent phone calls, imaging, labs, specialist's evaluation either in notes or in Media tab reviewed today.    Other outside pertinent medical information also reviewed thru Care Everywhere function is also reviewed today.    Review of Systems  Review of Systems  A comprehensive review of systems was negative.    Vitals:    08/28/20 0917 08/28/20 0936   BP: 120/72    BP Location: Left arm    Patient Position: Sitting    Cuff Size: Adult    Pulse: (!) 43 60   Resp:  16   SpO2: 99%    Weight: 91.6 kg (202 lb)    Height: 177.8 cm (70\")        General Appearance:  Alert, cooperative, no distress, appears stated age   Head:  Normocephalic, without obvious abnormality, atraumatic   Eyes:  PERRL, conjunctiva/corneas clear, EOM's intact.   Ears:  Normal TM's and external ear canals, both ears   Nose: Nares normal, septum midline, mucosa normal, no drainage or sinus tenderness   Throat: Lips, mucosa, and tongue normal; teeth and gums normal   Neck: Supple, symmetrical, trachea midline, no adenopathy;   thyroid: No enlargement/tenderness/nodules; no carotid  bruit   Back:  Symmetric, no curvature, ROM normal, no " CVA tenderness   Lungs:  Clear to auscultation bilaterally, respirations unlabored   Chest wall:  No tenderness or deformity   Heart:  Regular rate and rhythm, S1 and S2 normal, no murmur, rub or gallop   Abdomen:  Soft, non-tender, bowel sounds active all four quadrants,   no masses, no organomegaly   Rectal:        Extremities: Extremities normal, atraumatic, no cyanosis or edema   Pulses: 2+ and symmetric all extremities   Skin: Skin color, texture, turgor normal, no rashes or lesions   Lymph nodes: Cervical, supraclavicular, and axillary nodes normal   Neurologic: CNII-XII intact. Normal strength, sensation and reflexes   throughout          Results for orders placed or performed in visit on 05/22/20   Allergens, Zone 5   Result Value Ref Range    Class Description Comment     D. pteronyssinus (dust mite) <0.10 Class 0 kU/L    D. farinae (dust mite) <0.10 Class 0 kU/L    Cat Dander <0.10 Class 0 kU/L    Dog Dander, IgE <0.10 Class 0 kU/L    Bermuda Grass <0.10 Class 0 kU/L    Forks, Perennial <0.10 Class 0 kU/L    Britton Grass <0.10 Class 0 kU/L    Bahia Grass <0.10 Class 0 kU/L    Cockroach, American <0.10 Class 0 kU/L    Penicillium chrysogen <0.10 Class 0 kU/L    Cladosporium herbarum <0.10 Class 0 kU/L    Aspergillus fumigatus <0.10 Class 0 kU/L    Mucor racemosus <0.10 Class 0 kU/L    Alternaria alternata <0.10 Class 0 kU/L    Stemphylium herbarum <0.10 Class 0 kU/L    Elm, American <0.10 Class 0 kU/L    Eucalyptus <0.10 Class 0 kU/L    Maple/Vermilion <0.10 Class 0 kU/L    Ingraham, Italian <0.10 Class 0 kU/L    Pepper Tree <0.10 Class 0 kU/L    Putnam, Live/[place] <0.10 Class 0 kU/L    Privet, Common <0.10 Class 0 kU/L    Bayberry <0.10 Class 0 kU/L    Cisneros Palm <0.10 Class 0 kU/L    Ragweed, Common/Short <0.10 Class 0 kU/L    English Plantain <0.10 Class 0 kU/L    Lamb's Quarter <0.10 Class 0 kU/L    Sheep Sorrel <0.10 Class 0 kU/L    Dog Fennel <0.10 Class 0 kU/L   CBC (No Diff)   Result Value Ref Range     WBC 4.05 3.40 - 10.80 10*3/mm3    RBC 4.86 4.14 - 5.80 10*6/mm3    Hemoglobin 14.7 13.0 - 17.7 g/dL    Hematocrit 44.2 37.5 - 51.0 %    MCV 90.9 79.0 - 97.0 fL    MCH 30.2 26.6 - 33.0 pg    MCHC 33.3 31.5 - 35.7 g/dL    RDW 12.9 12.3 - 15.4 %    Platelets 168 140 - 450 10*3/mm3   Comprehensive Metabolic Panel   Result Value Ref Range    Glucose 88 65 - 99 mg/dL    BUN 18 6 - 20 mg/dL    Creatinine 1.15 0.76 - 1.27 mg/dL    eGFR Non African Am 71 >60 mL/min/1.73    eGFR African Am 86 >60 mL/min/1.73    BUN/Creatinine Ratio 15.7 7.0 - 25.0    Sodium 140 136 - 145 mmol/L    Potassium 4.1 3.5 - 5.2 mmol/L    Chloride 103 98 - 107 mmol/L    Total CO2 26.7 22.0 - 29.0 mmol/L    Calcium 9.2 8.6 - 10.5 mg/dL    Total Protein 6.9 6.0 - 8.5 g/dL    Albumin 5.00 3.50 - 5.20 g/dL    Globulin 1.9 gm/dL    A/G Ratio 2.6 g/dL    Total Bilirubin 1.0 0.0 - 1.2 mg/dL    Alkaline Phosphatase 41 39 - 117 U/L    AST (SGOT) 21 1 - 40 U/L    ALT (SGPT) 25 1 - 41 U/L   Lipid Panel   Result Value Ref Range    Total Cholesterol 189 0 - 200 mg/dL    Triglycerides 124 0 - 150 mg/dL    HDL Cholesterol 43 40 - 60 mg/dL    VLDL Cholesterol 24.8 mg/dL    LDL Cholesterol  121 (H) 0 - 100 mg/dL   TSH   Result Value Ref Range    TSH 1.610 0.270 - 4.200 uIU/mL   Urinalysis With Microscopic If Indicated (No Culture) - Urine, Clean Catch   Result Value Ref Range    Specific Gravity, UA 1.024 1.005 - 1.030    pH, UA 6.0 5.0 - 8.0    Color, UA Yellow     Appearance, UA Clear Clear    Leukocytes, UA Negative Negative    Protein Negative Negative    Glucose, UA Negative Negative    Ketones Negative Negative    Blood, UA Negative Negative    Bilirubin, UA Negative Negative    Urobilinogen, UA Comment     Nitrite, UA Negative Negative   Vitamin D 25 Hydroxy   Result Value Ref Range    25 Hydroxy, Vitamin D 36.9 30.0 - 100.0 ng/ml   Allergen Food Panel   Result Value Ref Range    Egg White <0.10 Class 0 kU/L    Milk, Cow's <0.10 Class 0 kU/L    CodFish  <0.10 Class 0 kU/L    Wheat <0.10 Class 0 kU/L    Rye <0.10 Class 0 kU/L    Barley <0.10 Class 0 kU/L    Oats <0.10 Class 0 kU/L    Corn <0.10 Class 0 kU/L    Rice <0.10 Class 0 kU/L    Peanut <0.10 Class 0 kU/L    Soybean <0.10 Class 0 kU/L    White Parada <0.10 Class 0 kU/L    Crab <0.10 Class 0 kU/L    Shrimp <0.10 Class 0 kU/L    Tomato <0.10 Class 0 kU/L    Pork <0.10 Class 0 kU/L    Beef <0.10 Class 0 kU/L    Carrot <0.10 Class 0 kU/L    Orange <0.10 Class 0 kU/L    Potato <0.10 Class 0 kU/L    Tuna <0.10 Class 0 kU/L    Chicken <0.10 Class 0 kU/L    Green Bell Pepper <0.10 Class 0 kU/L    Lettuce <0.10 Class 0 kU/L    Cabbage <0.10 Class 0 kU/L    Grape <0.10 Class 0 kU/L     Assessment/Plan   Healthy male exam.  Campbell was seen today for annual exam.    Diagnoses and all orders for this visit:    Routine adult health maintenance  -     CBC (No Diff); Future  -     Comprehensive Metabolic Panel; Future  -     Lipid Panel; Future  -     Urinalysis With Microscopic If Indicated (No Culture) - Urine, Clean Catch; Future      1. Labs all ok    Has upcoming right elbow surgery with [employer]    2. Patient Counseling:  --Nutrition: Stressed importance of moderation in sodium/caffeine intake, saturated fat and cholesterol.  Discussed caloric balance, sufficient intake of fresh fruits, vegetables, fiber, calcium, iron.  --Exercise: Stressed the importance of regular exercise.   --Substance Abuse: Discussed cessation/primary prevention of tobacco, alcohol, or other drug use; driving or other dangerous activities under the influence.    --Dental health: Discussed importance of regular tooth brushing, flossing, and dental visits.  --Suggested having eyes and vision checked if needed or past due.  --Immunizations reviewed and given if needed.  --  3. Discussed the patient's BMI with him.  The BMI is in the acceptable range  4. Follow up in one year    There are no Patient Instructions on file for this visit.    Medications  Discontinued During This Encounter   Medication Reason   • ondansetron ODT (ZOFRAN ODT) 4 MG disintegrating tablet *Therapy completed        Dr. Freddy Talley MD  [place], [place]  [facility]

## 2021-08-20 DIAGNOSIS — Z00.00 ROUTINE ADULT HEALTH MAINTENANCE: ICD-10-CM

## 2021-09-18 LAB
ALBUMIN SERPL-MCNC: 5.1 G/DL (ref 3.5–5.2)
ALBUMIN/GLOB SERPL: 2.4 G/DL
ALP SERPL-CCNC: 36 U/L (ref 39–117)
ALT SERPL-CCNC: 57 U/L (ref 1–41)
APPEARANCE UR: CLEAR
AST SERPL-CCNC: 32 U/L (ref 1–40)
BILIRUB SERPL-MCNC: 0.7 MG/DL (ref 0–1.2)
BILIRUB UR QL STRIP: NEGATIVE
BUN SERPL-MCNC: 14 MG/DL (ref 6–20)
BUN/CREAT SERPL: 12.4 (ref 7–25)
CALCIUM SERPL-MCNC: 9.8 MG/DL (ref 8.6–10.5)
CHLORIDE SERPL-SCNC: 101 MMOL/L (ref 98–107)
CHOLEST SERPL-MCNC: 172 MG/DL (ref 0–200)
CO2 SERPL-SCNC: 27.9 MMOL/L (ref 22–29)
COLOR UR: YELLOW
CREAT SERPL-MCNC: 1.13 MG/DL (ref 0.76–1.27)
ERYTHROCYTE [DISTWIDTH] IN BLOOD BY AUTOMATED COUNT: 12.5 % (ref 12.3–15.4)
GLOBULIN SER CALC-MCNC: 2.1 GM/DL
GLUCOSE SERPL-MCNC: 89 MG/DL (ref 65–99)
GLUCOSE UR QL: NEGATIVE
HCT VFR BLD AUTO: 46 % (ref 37.5–51)
HDLC SERPL-MCNC: 40 MG/DL (ref 40–60)
HGB BLD-MCNC: 14.9 G/DL (ref 13–17.7)
HGB UR QL STRIP: NEGATIVE
KETONES UR QL STRIP: NEGATIVE
LDLC SERPL CALC-MCNC: 116 MG/DL (ref 0–100)
LEUKOCYTE ESTERASE UR QL STRIP: NEGATIVE
MCH RBC QN AUTO: 29.7 PG (ref 26.6–33)
MCHC RBC AUTO-ENTMCNC: 32.4 G/DL (ref 31.5–35.7)
MCV RBC AUTO: 91.8 FL (ref 79–97)
NITRITE UR QL STRIP: NEGATIVE
PH UR STRIP: 7.5 [PH] (ref 5–8)
PLATELET # BLD AUTO: 204 10*3/MM3 (ref 140–450)
POTASSIUM SERPL-SCNC: 4.4 MMOL/L (ref 3.5–5.2)
PROT SERPL-MCNC: 7.2 G/DL (ref 6–8.5)
PROT UR QL STRIP: NEGATIVE
RBC # BLD AUTO: 5.01 10*6/MM3 (ref 4.14–5.8)
SODIUM SERPL-SCNC: 137 MMOL/L (ref 136–145)
SP GR UR: 1.02 (ref 1–1.03)
TRIGL SERPL-MCNC: 88 MG/DL (ref 0–150)
UROBILINOGEN UR STRIP-MCNC: NORMAL MG/DL
VLDLC SERPL CALC-MCNC: 16 MG/DL (ref 5–40)
WBC # BLD AUTO: 4.25 10*3/MM3 (ref 3.4–10.8)

## 2021-09-24 ENCOUNTER — OFFICE VISIT (OUTPATIENT)
Dept: FAMILY MEDICINE CLINIC | Facility: CLINIC | Age: 41
End: 2021-09-24

## 2021-09-24 VITALS
OXYGEN SATURATION: 99 % | WEIGHT: 201 LBS | SYSTOLIC BLOOD PRESSURE: 120 MMHG | HEART RATE: 53 BPM | HEIGHT: 70 IN | DIASTOLIC BLOOD PRESSURE: 70 MMHG | BODY MASS INDEX: 28.77 KG/M2

## 2021-09-24 DIAGNOSIS — Z00.00 ROUTINE ADULT HEALTH MAINTENANCE: Primary | ICD-10-CM

## 2021-09-24 PROBLEM — G57.61 MORTON NEUROMA, RIGHT: Status: ACTIVE | Noted: 2021-09-24

## 2021-09-24 PROCEDURE — 99396 PREV VISIT EST AGE 40-64: CPT | Performed by: FAMILY MEDICINE

## 2021-09-24 NOTE — PROGRESS NOTES
"  Chief Complaint   Patient presents with   • Annual Exam       Subjective   Campbell Mcintosh is a 40 y.o. male and is here for a yearly physical exam. The patient reports no problems.    Do you take any herbs or supplements that were not prescribed by a doctor? yes. If so, these will be added to active medication list.    The following portions of the patient's history were reviewed and updated as appropriate: allergies, current medications, past family history, past medical history, past social history, past surgical history and problem list.    Social and Family and Surgical History reviewed and updated today, see Rooming tab.    Health History, Preventive Measures and Vaccination flow sheets reviewed and updated today.    Patient's current medical chart in Epic; including previous office notes, Mychart messages, recent phone calls, imaging, labs, specialist's evaluation either in notes or in Media tab reviewed today.    Other outside pertinent medical information also reviewed thru Care Everywhere function is also reviewed today.    Review of Systems  Review of Systems  A comprehensive review of systems was negative.    Vitals:    09/24/21 1034   BP: 120/70   BP Location: Left arm   Patient Position: Sitting   Cuff Size: Adult   Pulse: 53   SpO2: 99%   Weight: 91.2 kg (201 lb)   Height: 177.8 cm (70\")       General Appearance:  Alert, cooperative, no distress, appears stated age   Head:  Normocephalic, without obvious abnormality, atraumatic   Eyes:  PERRL, conjunctiva/corneas clear, EOM's intact.   Ears:  Normal TM's and external ear canals, both ears   Nose: Nares normal, septum midline, mucosa normal, no drainage or sinus tenderness   Throat: Lips, mucosa, and tongue normal; teeth and gums normal   Neck: Supple, symmetrical, trachea midline, no adenopathy;   thyroid: No enlargement/tenderness/nodules; no carotid  bruit   Back:  Symmetric, no curvature, ROM normal, no CVA tenderness   Lungs:  Clear to " auscultation bilaterally, respirations unlabored   Chest wall:  No tenderness or deformity   Heart:  Regular rate and rhythm, S1 and S2 normal, no murmur, rub or gallop   Abdomen:  Soft, non-tender, bowel sounds active all four quadrants,   no masses, no organomegaly   Rectal:        Extremities: Extremities normal, atraumatic, no cyanosis or edema   Pulses: 2+ and symmetric all extremities   Skin: Skin color, texture, turgor normal, no rashes or lesions   Lymph nodes: Cervical, supraclavicular, and axillary nodes normal   Neurologic: CNII-XII intact. Normal strength, sensation and reflexes   throughout          Results for orders placed or performed in visit on 08/20/21   Urinalysis With Microscopic If Indicated (No Culture) - Urine, Clean Catch    Specimen: Urine, Clean Catch   Result Value Ref Range    Specific Gravity, UA 1.016 1.005 - 1.030    pH, UA 7.5 5.0 - 8.0    Color, UA Yellow     Appearance, UA Clear Clear    Leukocytes, UA Negative Negative    Protein Negative Negative    Glucose, UA Negative Negative    Ketones Negative Negative    Blood, UA Negative Negative    Bilirubin, UA Negative Negative    Urobilinogen, UA Comment     Nitrite, UA Negative Negative   Lipid Panel    Specimen: Blood   Result Value Ref Range    Total Cholesterol 172 0 - 200 mg/dL    Triglycerides 88 0 - 150 mg/dL    HDL Cholesterol 40 40 - 60 mg/dL    VLDL Cholesterol Grey 16 5 - 40 mg/dL    LDL Chol Calc ([facility]) 116 (H) 0 - 100 mg/dL   Comprehensive Metabolic Panel    Specimen: Blood   Result Value Ref Range    Glucose 89 65 - 99 mg/dL    BUN 14 6 - 20 mg/dL    Creatinine 1.13 0.76 - 1.27 mg/dL    eGFR Non African Am 72 >60 mL/min/1.73    eGFR African Am 87 >60 mL/min/1.73    BUN/Creatinine Ratio 12.4 7.0 - 25.0    Sodium 137 136 - 145 mmol/L    Potassium 4.4 3.5 - 5.2 mmol/L    Chloride 101 98 - 107 mmol/L    Total CO2 27.9 22.0 - 29.0 mmol/L    Calcium 9.8 8.6 - 10.5 mg/dL    Total Protein 7.2 6.0 - 8.5 g/dL    Albumin 5.10 3.50 -  5.20 g/dL    Globulin 2.1 gm/dL    A/G Ratio 2.4 g/dL    Total Bilirubin 0.7 0.0 - 1.2 mg/dL    Alkaline Phosphatase 36 (L) 39 - 117 U/L    AST (SGOT) 32 1 - 40 U/L    ALT (SGPT) 57 (H) 1 - 41 U/L   CBC (No Diff)    Specimen: Blood   Result Value Ref Range    WBC 4.25 3.40 - 10.80 10*3/mm3    RBC 5.01 4.14 - 5.80 10*6/mm3    Hemoglobin 14.9 13.0 - 17.7 g/dL    Hematocrit 46.0 37.5 - 51.0 %    MCV 91.8 79.0 - 97.0 fL    MCH 29.7 26.6 - 33.0 pg    MCHC 32.4 31.5 - 35.7 g/dL    RDW 12.5 12.3 - 15.4 %    Platelets 204 140 - 450 10*3/mm3     Assessment/Plan   Healthy male exam.  Diagnoses and all orders for this visit:    1. Routine adult health maintenance (Primary)  -     CBC (No Diff); Future  -     Comprehensive Metabolic Panel; Future  -     Lipid Panel; Future  -     Urinalysis With Microscopic If Indicated (No Culture) - Urine, Clean Catch; Future      1. Labs ok  2. Patient Counseling:  --Nutrition: Stressed importance of moderation in sodium/caffeine intake, saturated fat and cholesterol.  Discussed caloric balance, sufficient intake of fresh fruits, vegetables, fiber, calcium, iron.  --Exercise: Stressed the importance of regular exercise.   --Substance Abuse: Discussed cessation/primary prevention of tobacco, alcohol, or other drug use; driving or other dangerous activities under the influence.    --Dental health: Discussed importance of regular tooth brushing, flossing, and dental visits.  --Suggested having eyes and vision checked if needed or past due.  --Immunizations reviewed and given if needed.  --does not want COVID vaccine  Seeing podiatry for his Mortons neuroma right foot.    3. Discussed the patient's BMI with him.  The BMI is in the acceptable range  4. Follow up in one year    There are no Patient Instructions on file for this visit.    There are no discontinued medications.     Dr. Freddy Talley MD  [place], [place]  [facility]

## 2022-09-20 DIAGNOSIS — Z00.00 ROUTINE ADULT HEALTH MAINTENANCE: ICD-10-CM

## 2022-09-22 LAB
ALBUMIN SERPL-MCNC: 5.1 G/DL (ref 4–5)
ALBUMIN/GLOB SERPL: 2 {RATIO} (ref 1.2–2.2)
ALP SERPL-CCNC: 47 IU/L (ref 44–121)
ALT SERPL-CCNC: 25 IU/L (ref 0–44)
APPEARANCE UR: CLEAR
AST SERPL-CCNC: 21 IU/L (ref 0–40)
BILIRUB SERPL-MCNC: 1 MG/DL (ref 0–1.2)
BILIRUB UR QL STRIP: NEGATIVE
BUN SERPL-MCNC: 18 MG/DL (ref 6–24)
BUN/CREAT SERPL: 14 (ref 9–20)
CALCIUM SERPL-MCNC: 10.1 MG/DL (ref 8.7–10.2)
CHLORIDE SERPL-SCNC: 104 MMOL/L (ref 96–106)
CHOLEST SERPL-MCNC: 202 MG/DL (ref 100–199)
CO2 SERPL-SCNC: 24 MMOL/L (ref 20–29)
COLOR UR: YELLOW
CREAT SERPL-MCNC: 1.25 MG/DL (ref 0.76–1.27)
EGFRCR SERPLBLD CKD-EPI 2021: 74 ML/MIN/1.73
ERYTHROCYTE [DISTWIDTH] IN BLOOD BY AUTOMATED COUNT: 12.5 % (ref 11.6–15.4)
GLOBULIN SER CALC-MCNC: 2.5 G/DL (ref 1.5–4.5)
GLUCOSE SERPL-MCNC: 105 MG/DL (ref 65–99)
GLUCOSE UR QL STRIP: NEGATIVE
HCT VFR BLD AUTO: 46.6 % (ref 37.5–51)
HDLC SERPL-MCNC: 48 MG/DL
HGB BLD-MCNC: 15.5 G/DL (ref 13–17.7)
HGB UR QL STRIP: NEGATIVE
KETONES UR QL STRIP: NEGATIVE
LDLC SERPL CALC-MCNC: 133 MG/DL (ref 0–99)
LEUKOCYTE ESTERASE UR QL STRIP: NEGATIVE
MCH RBC QN AUTO: 30.2 PG (ref 26.6–33)
MCHC RBC AUTO-ENTMCNC: 33.3 G/DL (ref 31.5–35.7)
MCV RBC AUTO: 91 FL (ref 79–97)
MICRO URNS: NORMAL
NITRITE UR QL STRIP: NEGATIVE
PH UR STRIP: 6 [PH] (ref 5–7.5)
PLATELET # BLD AUTO: 153 X10E3/UL (ref 150–450)
POTASSIUM SERPL-SCNC: 5 MMOL/L (ref 3.5–5.2)
PROT SERPL-MCNC: 7.6 G/DL (ref 6–8.5)
PROT UR QL STRIP: NEGATIVE
RBC # BLD AUTO: 5.13 X10E6/UL (ref 4.14–5.8)
SODIUM SERPL-SCNC: 143 MMOL/L (ref 134–144)
SP GR UR STRIP: 1.02 (ref 1–1.03)
TRIGL SERPL-MCNC: 116 MG/DL (ref 0–149)
UROBILINOGEN UR STRIP-MCNC: 0.2 MG/DL (ref 0.2–1)
VLDLC SERPL CALC-MCNC: 21 MG/DL (ref 5–40)
WBC # BLD AUTO: 4.2 X10E3/UL (ref 3.4–10.8)

## 2022-12-06 ENCOUNTER — OFFICE VISIT (OUTPATIENT)
Dept: FAMILY MEDICINE CLINIC | Facility: CLINIC | Age: 42
End: 2022-12-06

## 2022-12-06 VITALS — OXYGEN SATURATION: 100 % | HEART RATE: 88 BPM | HEIGHT: 70 IN | TEMPERATURE: 102 F | BODY MASS INDEX: 28.84 KG/M2

## 2022-12-06 DIAGNOSIS — Z20.828 EXPOSURE TO THE FLU: Primary | ICD-10-CM

## 2022-12-06 DIAGNOSIS — R52 BODY ACHES: ICD-10-CM

## 2022-12-06 DIAGNOSIS — R50.9 FEVER, UNSPECIFIED FEVER CAUSE: ICD-10-CM

## 2022-12-06 LAB
EXPIRATION DATE: NORMAL
FLUAV AG UPPER RESP QL IA.RAPID: NOT DETECTED
FLUBV AG UPPER RESP QL IA.RAPID: NOT DETECTED
INTERNAL CONTROL: NORMAL
Lab: NORMAL
SARS-COV-2 AG UPPER RESP QL IA.RAPID: NOT DETECTED

## 2022-12-06 PROCEDURE — 87428 SARSCOV & INF VIR A&B AG IA: CPT | Performed by: FAMILY MEDICINE

## 2022-12-06 PROCEDURE — 99213 OFFICE O/P EST LOW 20 MIN: CPT | Performed by: FAMILY MEDICINE

## 2022-12-06 RX ORDER — OSELTAMIVIR PHOSPHATE 75 MG/1
75 CAPSULE ORAL 2 TIMES DAILY
Qty: 10 CAPSULE | Refills: 0 | Status: SHIPPED | OUTPATIENT
Start: 2022-12-06 | End: 2022-12-13 | Stop reason: SINTOL

## 2022-12-06 NOTE — PROGRESS NOTES
"  Chief Complaint   Patient presents with   • Chills   • Fever   • Cough   • Generalized Body Aches     Chest congestion   • flu exposure     2 children in home positive for flu a       Upper Respiratory Infection: Patient complains of symptoms of a URI. Symptoms include congestion, cough, diarrhea and sore throat. Onset of symptoms was 1 day ago, gradually worsening since that time. He also c/o achiness and congestion for the past 1 day .  He is drinking moderate amounts of fluids. Evaluation to date: none. Treatment to date: none.  Ill contacts at home or school or work discussed.  Has 2 children at home sick with influenza type a      Vitals:    12/06/22 1115   Pulse: 88   Temp: (!) 102 °F (38.9 °C)   SpO2: 100%   Height: 177.8 cm (70\")     Gen: mildly ill appearing, alert  Ears: Tm's bulging without redness  Nose:  Congestion  Throat:  Red without exudate, some drainage, tonsils okay  Neck: no LAD  Lung:  good air movement, regular RR  Heart: RR without murmur  Skin: no rash.      Assessment & Plan   Diagnoses and all orders for this visit:    1. Exposure to the flu (Primary)  -     POCT SARS-CoV-2 Antigen IJEOMA + Flu  -     oseltamivir (Tamiflu) 75 MG capsule; Take 1 capsule by mouth 2 (Two) Times a Day. Indications: Influenza A  Dispense: 10 capsule; Refill: 0    2. Body aches  -     POCT SARS-CoV-2 Antigen IJEOMA + Flu    3. Fever, unspecified fever cause  -     POCT SARS-CoV-2 Antigen IJEOMA + Flu  -     oseltamivir (Tamiflu) 75 MG capsule; Take 1 capsule by mouth 2 (Two) Times a Day. Indications: Influenza A  Dispense: 10 capsule; Refill: 0    His in office nasal swab for influenza and COVID is negative.  Most likely this is a false negative result, symptoms only been present for 36 hours.  We will go ahead and treat as influenza A.  Off work this week           There are no Patient Instructions on file for this visit.    Tylenol or Advil as needed for pain, fever, muscle aches  Plenty of fluids  Hand washing " discussed  Off work note given if needed.  Warm tea for throat.  Pros and cons of antibiotic use discussed    Dr. Freddy Talley MD  [place], [place]  [facility]

## 2022-12-13 ENCOUNTER — OFFICE VISIT (OUTPATIENT)
Dept: FAMILY MEDICINE CLINIC | Facility: CLINIC | Age: 42
End: 2022-12-13

## 2022-12-13 VITALS
OXYGEN SATURATION: 95 % | HEIGHT: 70 IN | WEIGHT: 200 LBS | TEMPERATURE: 96.9 F | HEART RATE: 56 BPM | BODY MASS INDEX: 28.63 KG/M2 | SYSTOLIC BLOOD PRESSURE: 144 MMHG | DIASTOLIC BLOOD PRESSURE: 80 MMHG

## 2022-12-13 DIAGNOSIS — Z00.00 ROUTINE ADULT HEALTH MAINTENANCE: Primary | ICD-10-CM

## 2022-12-13 PROCEDURE — 99396 PREV VISIT EST AGE 40-64: CPT | Performed by: FAMILY MEDICINE

## 2022-12-13 NOTE — PROGRESS NOTES
"  Chief Complaint   Patient presents with   • Annual Exam       Subjective   Campbell Mcintosh is a 42 y.o. male and is here for a yearly physical exam. The patient reports problems - did not tolerate the tamiflu, threw it up..    Do you take any herbs or supplements that were not prescribed by a doctor? no. If so, these will be added to active medication list.    The following portions of the patient's history were reviewed and updated as appropriate: allergies, current medications, past family history, past medical history, past social history, past surgical history and problem list.    Social and Family and Surgical History reviewed and updated today, see Rooming tab.    Health History, Preventive Measures and Vaccination flow sheets reviewed and updated today.    Patient's current medical chart in Epic; including previous office notes, Mychart messages, recent phone calls, imaging, labs, specialist's evaluation either in notes or in Media tab reviewed today.    Other outside pertinent medical information also reviewed thru Care Everywhere function is also reviewed today.    Review of Systems  Review of Systems  A comprehensive review of systems was negative.    Vitals:    12/13/22 1505   BP: 144/80   BP Location: Left arm   Patient Position: Sitting   Cuff Size: Adult   Pulse: 56   Temp: 96.9 °F (36.1 °C)   SpO2: 95%   Weight: 90.7 kg (200 lb)   Height: 177.8 cm (70\")     Body mass index is 28.7 kg/m².      General Appearance:  Alert, cooperative, no distress, appears stated age   Head:  Normocephalic, without obvious abnormality, atraumatic   Eyes:  PERRL, conjunctiva/corneas clear, EOM's intact.   Ears:  Normal TM's and external ear canals, both ears   Nose: Nares normal, septum midline, mucosa normal, no drainage or sinus tenderness   Throat: Lips, mucosa, and tongue normal; teeth and gums normal   Neck: Supple, symmetrical, trachea midline, no adenopathy;   thyroid: No enlargement/tenderness/nodules; no " carotid  bruit   Back:  Symmetric, no curvature, ROM normal, no CVA tenderness   Lungs:  Clear to auscultation bilaterally, respirations unlabored   Chest wall:  No tenderness or deformity   Heart:  Regular rate and rhythm, S1 and S2 normal, no murmur, rub or gallop   Abdomen:  Soft, non-tender, bowel sounds active all four quadrants,   no masses, no organomegaly   Rectal:        Extremities: Extremities normal, atraumatic, no cyanosis or edema   Pulses: 2+ and symmetric all extremities   Skin: Skin color, texture, turgor normal, no rashes or lesions   Lymph nodes: Cervical, supraclavicular, and axillary nodes normal   Neurologic: CNII-XII intact. Normal strength, sensation.            Assessment & Plan   Healthy male exam.  Diagnoses and all orders for this visit:    1. Routine adult health maintenance (Primary)      1. Chol up some  Labs from 09/21/22 reviewed  2. Patient Counseling:  --Nutrition: Stressed importance of moderation in: sodium, caffeine, , saturated fat and cholesterol.  Discussed: caloric balance, sufficient intake of fresh fruits, vegetables, fiber, calcium, iron.  --Exercise: Stressed the importance of regular exercise.   --Substance Abuse: Discussed cessation and or primary prevention of tobacco, alcohol, or other drug use; driving or other dangerous activities under the influence.    --Dental health: Discussed importance of regular tooth brushing, flossing, and dental visits.  --Suggested having eyes and vision checked if needed or past due.  --Immunizations reviewed and given if needed.    3. Discussed the patient's BMI with him.  The BMI is in the acceptable range  4. Follow up in one year    There are no Patient Instructions on file for this visit.    Medications Discontinued During This Encounter   Medication Reason   • oseltamivir (Tamiflu) 75 MG capsule Side effects        Dr. Freddy Talley MD  [place], [place]  [facility]

## 2023-05-09 ENCOUNTER — OFFICE VISIT (OUTPATIENT)
Dept: FAMILY MEDICINE CLINIC | Facility: CLINIC | Age: 43
End: 2023-05-09
Payer: COMMERCIAL

## 2023-05-09 VITALS
HEIGHT: 70 IN | SYSTOLIC BLOOD PRESSURE: 146 MMHG | WEIGHT: 202 LBS | DIASTOLIC BLOOD PRESSURE: 100 MMHG | OXYGEN SATURATION: 99 % | BODY MASS INDEX: 28.92 KG/M2 | TEMPERATURE: 97.8 F | HEART RATE: 78 BPM

## 2023-05-09 DIAGNOSIS — J02.9 SORE THROAT: ICD-10-CM

## 2023-05-09 DIAGNOSIS — Z20.818 STREP THROAT EXPOSURE: Primary | ICD-10-CM

## 2023-05-09 LAB
EXPIRATION DATE: NORMAL
INTERNAL CONTROL: NORMAL
Lab: NORMAL
S PYO AG THROAT QL: NEGATIVE

## 2023-05-09 NOTE — PROGRESS NOTES
"  Chief Complaint   Patient presents with   • Sore Throat   • Nasal Congestion       Upper Respiratory Infection: Patient complains of symptoms of a URI. Symptoms include congestion and sore throat. Onset of symptoms was 2 days ago, unchanged since that time. He also c/o no  fever for the past 1 week .  He is drinking plenty of fluids. Evaluation to date: none. Treatment to date: none.  Ill contacts at home or work discussed.  His son is sick.  Tested positive for strep throat.  Josh has a sore throat wants to make sure he does not have strep.  He is feeling somewhat better today      Vitals:    05/09/23 1551   BP: 146/100   Pulse: 78   Temp: 97.8 °F (36.6 °C)   SpO2: 99%   Weight: 91.6 kg (202 lb)   Height: 177.8 cm (70\")     Gen: well appearing, alert  Ears: Tm's without redness  Nose:  Congestion  Throat: without exudate, some drainage, tonsils okay  Neck: no LAD  Lung: good air movement, regular RR  Heart: RR without murmur  Skin: no rash.      Results for orders placed or performed in visit on 05/09/23   POCT rapid strep A    Specimen: Swab   Result Value Ref Range    Rapid Strep A Screen Negative Negative, VALID, INVALID, Not Performed    Internal Control Passed Passed    Lot Number [id number]     Expiration Date 04/11/24            Assessment & Plan   Diagnoses and all orders for this visit:    1. Strep throat exposure (Primary)  -     POCT rapid strep A    2. Sore throat  -     POCT rapid strep A    Strep swab was negative.  Exam unrevealing         There are no Patient Instructions on file for this visit.    Tylenol or Advil as needed for pain, fever, muscle aches  Plenty of fluids  Hand washing discussed  Off work or school note given if needed.  Warm tea for throat.  Pros and cons of antibiotic use discussed    Dr. Freddy Talley MD  [place]  [facility]  "

## 2023-05-12 ENCOUNTER — TELEPHONE (OUTPATIENT)
Dept: FAMILY MEDICINE CLINIC | Facility: CLINIC | Age: 43
End: 2023-05-12

## 2023-05-12 NOTE — TELEPHONE ENCOUNTER
"Caller: Campbell Mcintosh \"MARYANN\"    Relationship to patient: Self    Best call back number: [phone]    Patient is needing:JOON IS CALLING TO UPDATE THAT HIS SYMPTOMS HAVE NOT IMPROVED SINCE OFFICE VISIT ON Tuesday 5/9/23. PATIENT IS REQUESTING ADDITIONAL MEDICATION TO TREAT.     [facility] - [place], [place] - [address] - [phone]  - [phone] FX  [phone]  "

## 2023-05-12 NOTE — TELEPHONE ENCOUNTER
Called pt advised him  has left for the evening, recommended him to seek further treatment at a UC.

## 2023-11-29 ENCOUNTER — APPOINTMENT (OUTPATIENT)
Dept: GENERAL RADIOLOGY | Facility: HOSPITAL | Age: 43
End: 2023-11-29
Payer: COMMERCIAL

## 2023-11-29 ENCOUNTER — HOSPITAL ENCOUNTER (EMERGENCY)
Facility: HOSPITAL | Age: 43
Discharge: HOME | End: 2023-11-29
Attending: EMERGENCY MEDICINE
Payer: COMMERCIAL

## 2023-11-29 VITALS
HEART RATE: 54 BPM | RESPIRATION RATE: 18 BRPM | TEMPERATURE: 98.5 F | SYSTOLIC BLOOD PRESSURE: 165 MMHG | DIASTOLIC BLOOD PRESSURE: 114 MMHG | OXYGEN SATURATION: 98 %

## 2023-11-29 DIAGNOSIS — M25.531 RIGHT WRIST PAIN: Primary | ICD-10-CM

## 2023-11-29 PROCEDURE — 99282 EMERGENCY DEPT VISIT SF MDM: CPT

## 2023-11-29 PROCEDURE — 73110 X-RAY EXAM OF WRIST: CPT

## 2023-11-29 RX ORDER — NAPROXEN 500 MG/1
500 TABLET ORAL 2 TIMES DAILY PRN
Qty: 20 TABLET | Refills: 0 | Status: SHIPPED | OUTPATIENT
Start: 2023-11-29

## 2023-11-29 NOTE — ED PROVIDER NOTES
EMERGENCY DEPARTMENT ENCOUNTER      Room Number: 01/01    History is provided by the patient, no translation services needed    HPI:    Chief complaint: Wrist pain    Location: Right wrist    Quality/Severity:  Patient describes the pain as an aching pain that he rates a 6 out of 10.    Timing/Duration: Intermittently x2 weeks    Modifying Factors: Movement makes the pain worse    Associated Symptoms: None    Narrative: Pt is a 43 y.o. male who presents complaining of right wrist pain that has occurred intermittently x2 weeks. He advises that he was lifting light weights when he accidentally injured his right wrist. He has noticed pain with certain movement of the wrist ever since. He denies any other injuries. He denies any neck or back pain.      PMD: Freddy Talley MD    REVIEW OF SYSTEMS  Review of Systems   Constitutional:  Negative for fever.   Eyes:  Negative for visual disturbance.   Respiratory:  Negative for shortness of breath.    Cardiovascular:  Negative for chest pain.   Gastrointestinal:  Negative for abdominal pain, nausea and vomiting.   Musculoskeletal:  Negative for back pain, gait problem, joint swelling and neck pain.   Skin:  Negative for color change, pallor, rash and wound.   Neurological:  Negative for dizziness, syncope, weakness, numbness and headaches.   Psychiatric/Behavioral:  Negative for confusion. The patient is not nervous/anxious.          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     Resolved Ambulatory Problems     Diagnosis Date Noted    Laceration of right lower leg 03/09/2018    Encounter for removal of sutures 03/09/2018     No Additional Past Medical History       PAST SURGICAL HISTORY  Past Surgical History:   Procedure Laterality Date     FRACTURE SURGERY Left 03/2017    NASAL SEPTUM SURGERY      as a child       FAMILY HISTORY  Family History   Problem Relation Age of Onset    Hypertension Mother     Alcohol abuse Father     No Known Problems Sister     No Known Problems Brother        SOCIAL HISTORY  Social History     Socioeconomic History    Marital status:     Number of children: 3   Tobacco Use    Smoking status: Never    Smokeless tobacco: Never   Vaping Use    Vaping Use: Never used   Substance and Sexual Activity    Alcohol use: Yes     Alcohol/week: 1.0 standard drink of alcohol     Types: 1 Drinks containing 0.5 oz of alcohol per week     Comment: Sometimes a few drinks per week, sometimes none    Drug use: No    Sexual activity: Yes     Partners: Female       ALLERGIES  Tamiflu [oseltamivir] and Percocet [oxycodone-acetaminophen]    No current facility-administered medications for this encounter.    Current Outpatient Medications:     naproxen (EC NAPROSYN) 500 MG EC tablet, Take 1 tablet by mouth 2 (Two) Times a Day As Needed for Mild Pain., Disp: 20 tablet, Rfl: 0    PHYSICAL EXAM  ED Triage Vitals   Temp Heart Rate Resp BP SpO2   11/29/23 1701 11/29/23 1701 11/29/23 1701 11/29/23 1700 11/29/23 1701   98.5 °F (36.9 °C) 54 18 (!) 165/114 98 %      Temp src Heart Rate Source Patient Position BP Location FiO2 (%)   11/29/23 1701 -- -- -- --   Oral           Physical Exam  Vitals and nursing note reviewed.   Constitutional:       General: He is not in acute distress.     Appearance: Normal appearance. He is not ill-appearing, toxic-appearing or diaphoretic.   HENT:      Head: Normocephalic and atraumatic.      Nose: Nose normal. No congestion or rhinorrhea.      Mouth/Throat:      Mouth: Mucous membranes are moist.      Pharynx: Oropharynx is clear.   Eyes:      General: No scleral icterus.        Right eye: No discharge.         Left eye: No discharge.      Extraocular Movements: Extraocular movements intact.       Conjunctiva/sclera: Conjunctivae normal.      Pupils: Pupils are equal, round, and reactive to light.   Cardiovascular:      Rate and Rhythm: Normal rate and regular rhythm.      Pulses: Normal pulses.   Pulmonary:      Effort: Pulmonary effort is normal. No respiratory distress.   Abdominal:      General: There is no distension.      Palpations: Abdomen is soft.   Musculoskeletal:         General: No swelling, tenderness, deformity or signs of injury. Normal range of motion.      Cervical back: Normal range of motion and neck supple. No rigidity.      Right lower leg: No edema.      Left lower leg: No edema.   Skin:     General: Skin is warm and dry.      Coloration: Skin is not jaundiced or pale.      Findings: No bruising, erythema, lesion or rash.   Neurological:      Mental Status: He is alert and oriented to person, place, and time.      Motor: No weakness.      Coordination: Coordination normal.      Gait: Gait normal.   Psychiatric:         Mood and Affect: Mood and affect normal.           LAB RESULTS  Lab Results (last 24 hours)       ** No results found for the last 24 hours. **              RADIOLOGY  XR Wrist 3+ View Right    Result Date: 11/29/2023  RIGHT WRIST, 11/29/2023  HISTORY: 43-year-old male noting 2-week history of wrist pain after weight lifting.  TECHNIQUE: Three view right wrist series.  FINDINGS: No fracture, dislocation, arthropathy or other osseous abnormality is demonstrated.      Negative right wrist series.  This report was finalized on 11/29/2023 6:16 PM by Dr. Alban Schmitz MD.       I ordered the above radiologic testing and reviewed the results    PROCEDURES  Procedures      PROGRESS AND CONSULTS  ED Course as of 11/29/23 1820   Wed Nov 29, 2023   1730 Patient appears well. He is in no acute distress. No obvious swelling, deformity, or discoloration noted on exam. He is neurovascularly intact. Pulses are 2+. Capillary refill is within normal limits. Imaging ordered to evaluate  further. []   1735 ROM is WNL. Patient was playing on his phone without difficulty or discomfort as I walked into the room. []   1818 Results discussed in depth with the patient. He expressed understanding. Follow up instructions given. Return to the ED instructions given. []      ED Course User Index  [] Varsha White PA-C           MEDICAL DECISION MAKING    MDM       My differential diagnosis of the upper extremity pain or injury includes but is not limited to contusions of the shoulder, forearm, arm, wrist, elbow or hand, dislocations of shoulder, elbow, wrist, digits, nursemaid's elbow (age-appropriate), shoulder sprain, elbow sprain, wrist sprain, digit sprain, shoulder strain, arm strain, forearm strain, elbow strain, wrist strain, hand sprain, digit strain, lacerations of the upper extremity, fractures both closed and open of clavicle, scapula, humerus, radius, ulna, bones of the wrist, hands and digits, cellulitis or abscess, cervical radiculopathy, radial nerve palsy, neurogenic upper extremity pain, compartment syndrome.   DIAGNOSIS  Final diagnoses:   Right wrist pain       Latest Documented Vital Signs:  As of 18:20 EST  BP- (!) 165/114 HR- 54 Temp- 98.5 °F (36.9 °C) (Oral) O2 sat- 98%    DISPOSITION  Pt discharged    Discussed pertinent findings with the patient/family.  Patient/Family voiced understanding of need to follow-up for recheck and further testing as needed.  Return to the Emergency Department warnings were given.         Medication List        New Prescriptions      naproxen 500 MG EC tablet  Commonly known as: EC NAPROSYN  Take 1 tablet by mouth 2 (Two) Times a Day As Needed for Mild Pain.               Where to Get Your Medications        These medications were sent to [facility] - [place] - [address] - [phone]  - [phone] FX  [address] [place] [address]      Phone: [phone]   naproxen 500 MG EC tablet              Follow-up  Information       Freddy Talley MD. Call today.    Specialty: Family Medicine  Why: to schedule follow up  Contact information:  [address] [facility] [address] [phone]               [facility]. Call today.    Specialty: Orthopedic Surgery  Why: to schedule follow up  Contact information:  [address]  [address] [address] [phone]  Additional information:  Phone Number: [phone]             Go to  [facility] EMERGENCY DEPARTMENT.    Specialty: Emergency Medicine  Why: If symptoms worsen  Contact information:  [address]  [facility] [address] [phone]                             Dictated utilizing eÃ“ticaon dictation     Varsha White PA-C  11/29/23 1823

## 2023-11-29 NOTE — DISCHARGE INSTRUCTIONS
Medication as directed.  Apply ice as needed for pain.  Apply heat to help relax muscles and increased blood flow.  Gentle stretching.  Follow-up with your PCP or orthopedist as above.  Return to ED for worsening symptoms or medical emergencies.

## 2023-12-06 DIAGNOSIS — Z00.00 ROUTINE ADULT HEALTH MAINTENANCE: Primary | ICD-10-CM

## 2023-12-27 ENCOUNTER — OFFICE VISIT (OUTPATIENT)
Dept: FAMILY MEDICINE CLINIC | Facility: CLINIC | Age: 43
End: 2023-12-27
Payer: COMMERCIAL

## 2023-12-27 VITALS
WEIGHT: 206 LBS | SYSTOLIC BLOOD PRESSURE: 132 MMHG | HEIGHT: 70 IN | HEART RATE: 60 BPM | DIASTOLIC BLOOD PRESSURE: 78 MMHG | OXYGEN SATURATION: 98 % | BODY MASS INDEX: 29.49 KG/M2 | TEMPERATURE: 97.3 F

## 2023-12-27 DIAGNOSIS — Z00.00 ROUTINE ADULT HEALTH MAINTENANCE: Primary | ICD-10-CM

## 2023-12-27 PROBLEM — G47.61 PERIODIC LIMB MOVEMENT: Status: RESOLVED | Noted: 2017-07-06 | Resolved: 2023-12-27

## 2023-12-27 PROBLEM — G57.61 MORTON NEUROMA, RIGHT: Status: RESOLVED | Noted: 2021-09-24 | Resolved: 2023-12-27

## 2023-12-27 PROBLEM — M77.02 GOLFERS ELBOW OF LEFT UPPER EXTREMITY: Status: RESOLVED | Noted: 2017-07-06 | Resolved: 2023-12-27

## 2023-12-27 PROBLEM — F51.01 PRIMARY INSOMNIA: Status: RESOLVED | Noted: 2019-02-26 | Resolved: 2023-12-27

## 2023-12-27 PROCEDURE — 99396 PREV VISIT EST AGE 40-64: CPT | Performed by: FAMILY MEDICINE

## 2023-12-27 NOTE — PROGRESS NOTES
"  Chief Complaint   Patient presents with    Annual Exam       Subjective   Campbell Mcintosh is a 43 y.o. male and is here for a yearly physical exam. The patient reports no problems.    Do you take any herbs or supplements that were not prescribed by a doctor? no. If so, these will be added to active medication list.    The following portions of the patient's history were reviewed and updated as appropriate: allergies, current medications, past family history, past medical history, past social history, past surgical history, and problem list.    Social and Family and Surgical History reviewed and updated today, see Rooming tab.    Health History, Preventive Measures and Vaccination flow sheets reviewed and updated today.    Patient's current medical chart in Epic; including previous office notes, Mychart messages, recent phone calls, imaging, labs, specialist's evaluation either in notes or in Media tab reviewed today.    Other outside pertinent medical information also reviewed thru Care Everywhere function is also reviewed today.    Review of Systems  Review of Systems  A comprehensive review of systems was negative.    Vitals:    12/27/23 1424   BP: 132/78   Pulse: 60   Temp: 97.3 °F (36.3 °C)   SpO2: 98%   Weight: 93.4 kg (206 lb)   Height: 177.8 cm (70\")     Body mass index is 29.56 kg/m².  BMI is >= 25 and <30. (Overweight) The following options were offered after discussion;: exercise counseling/recommendations and nutrition counseling/recommendations          General Appearance:  Alert, cooperative, no distress, appears stated age   Head:  Normocephalic, without obvious abnormality, atraumatic   Eyes:  PERRL, conjunctiva/corneas clear, EOM's intact.   Ears:  Normal TM's and external ear canals, both ears   Nose: Nares normal, septum midline, mucosa normal, no drainage or sinus tenderness   Throat: Lips, mucosa, and tongue normal; teeth and gums normal   Neck: Supple, symmetrical, trachea midline, no " adenopathy;   thyroid: No enlargement/tenderness/nodules; no carotid  bruit   Back:  Symmetric, no curvature, ROM normal, no CVA tenderness   Lungs:  Clear to auscultation bilaterally, respirations unlabored   Chest wall:  No tenderness or deformity   Heart:  Regular rate and rhythm, S1 and S2 normal, no murmur, rub or gallop   Abdomen:  Soft, non-tender, bowel sounds active all four quadrants,   no masses, no organomegaly   Rectal:        Extremities: Extremities normal, atraumatic, no cyanosis or edema   Pulses: 2+ and symmetric all extremities   Skin: Skin color, texture, turgor normal, no rashes or lesions   Lymph nodes: Cervical, supraclavicular, and axillary nodes normal   Neurologic: CNII-XII intact. Normal strength, sensation.            Assessment & Plan   Healthy male exam.  Diagnoses and all orders for this visit:    1. Routine adult health maintenance (Primary)      1. Doing well  2. Patient Counseling:  --Nutrition: Stressed importance of moderation in: sodium, caffeine, , saturated fat and cholesterol.  Discussed: caloric balance, sufficient intake of fresh fruits, vegetables, fiber, calcium, iron.  --Exercise: Stressed the importance of regular exercise.   --Substance Abuse: Discussed cessation and or reduction of tobacco, alcohol, or other drug use; driving or other dangerous activities under the influence.    --Dental health: Discussed importance of regular tooth brushing, flossing, and dental visits.  --Suggested having eyes and vision checked if needed or past due.  --Immunizations reviewed and given if needed.  -  3. Discussed the patient's BMI with him.  The BMI is in the acceptable range  4. Follow up next physical in 1 year    There are no Patient Instructions on file for this visit.    There are no discontinued medications.     Dr. Freddy Talley MD  Family [place], [place]  [facility]

## 2024-05-21 ENCOUNTER — OFFICE VISIT (OUTPATIENT)
Dept: SPORTS MEDICINE | Facility: CLINIC | Age: 44
End: 2024-05-21
Payer: COMMERCIAL

## 2024-05-21 VITALS
HEIGHT: 70 IN | TEMPERATURE: 97 F | RESPIRATION RATE: 16 BRPM | BODY MASS INDEX: 29.35 KG/M2 | HEART RATE: 78 BPM | OXYGEN SATURATION: 99 % | DIASTOLIC BLOOD PRESSURE: 82 MMHG | SYSTOLIC BLOOD PRESSURE: 118 MMHG | WEIGHT: 205 LBS

## 2024-05-21 DIAGNOSIS — G89.29 CHRONIC PAIN OF RIGHT ANKLE: Primary | ICD-10-CM

## 2024-05-21 DIAGNOSIS — M25.571 CHRONIC PAIN OF RIGHT ANKLE: Primary | ICD-10-CM

## 2024-05-21 DIAGNOSIS — M76.61 INSERTIONAL TENDINOPATHY OF RIGHT ACHILLES TENDON: ICD-10-CM

## 2024-05-21 PROCEDURE — 99214 OFFICE O/P EST MOD 30 MIN: CPT | Performed by: FAMILY MEDICINE

## 2024-05-21 NOTE — PROGRESS NOTES
"Campbell is a 43 y.o. year old male presents to [facility]    Chief Complaint   Patient presents with    Ankle Pain     Right ankle pain after slide tackle in soccer three weeks ago.        History of Present Illness  History of Present Illness  The patient presents for evaluation of ankle pain.    The patient reports a chronic issue with his ankle, with a history of multiple sprains. He suspects a previous fracture, which was managed with an Ace bandage. Several years ago, he sustained a bone spur and a partial tear due to strenuous running, although no surgical intervention was pursued. He experiences flare-ups of pain during high-impact activities such as running, biking, or other strenuous activities. Topical applications of both cold and heat therapy have been effective in managing the pain. 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 He noticed a black discoloration in the area approximately 2 weeks ago, accompanied by persistent tenderness and a burning sensation in the back. Despite some improvement, he maintains an active lifestyle at work, which involves kneeling, squatting, and twisting and turning movements with his ankles. He also reports significant scar tissue on both ankles. He has taken Advil once for pain management and has a high pain threshold. An x-ray was performed, revealing a fracture. Several years ago, he received an injection in his Achilles from a foot and ankle specialist, which he believes exacerbated his Achilles condition. He took some time off work and performed stretching exercises.    I have reviewed the patient's medical, family, and social history in detail and updated the computerized patient record.    /82 (BP Location: Left arm, Patient Position: Sitting, Cuff Size: Adult)   Pulse 78   Temp 97 °F (36.1 °C)   Resp 16   Ht 177.8 cm (70\")   Wt 93 kg (205 lb)   " SpO2 99%   BMI 29.41 kg/m²      Physical Exam    Vital signs reviewed.   General: No acute distress.  Eyes: conjunctiva clear; pupils equally round and reactive  ENT: external ears atraumatic  CV: no peripheral edema  Resp: normal respiratory effort, no use of accessory muscles  Skin: no rashes or wounds; normal turgor  Psych: mood and affect appropriate; recent and remote memory intact  Neuro: sensation to light touch intact    MSK Exam  Physical Exam  In the right ankle and foot, the anterior drawer test is negative. Perry's squeeze test is negative. There is an area of ecchymosis proximal to the medial malleolus on the medial ankle. There is tenderness along the course of the posterior tibialis tendon. There is also tenderness along the insertion of the Achilles tendon at the calcaneus. Full motion and full strength are observed.    XR Ankle 3+ View Right (05/07/2024 16:24)       Results  Imaging  X-ray of the ankle shows no fracture, but shows a bone spur.         Diagnoses and all orders for this visit:    Chronic pain of right ankle  -     MRI Ankle Right Without Contrast; Future    Insertional tendinopathy of right Achilles tendon  -     MRI Ankle Right Without Contrast; Future      Assessment & Plan  1. Pain in the ankle.  The patient's recent injury is likely attributable to the soft tissue, which is expected to resolve over time. His tendons and ligaments in the area exhibit strong strength and do not suggest any tears. A comprehensive discussion was held with the patient regarding potential treatment options, including PRP injection, physical therapy, and surgical intervention. An MRI will be ordered to investigate the possibility of a tear.          Follow Up     Patient was given instructions and counseling regarding his condition or for health maintenance advice. Please see specific information pulled into the AVS if appropriate.     Patient or patient representative verbalized consent for the use  of Ambient Listening during the visit with  KORI Pelletier Jr., DO for chart documentation. 5/21/2024  14:42 EDT

## 2024-05-23 ENCOUNTER — PATIENT ROUNDING (BHMG ONLY) (OUTPATIENT)
Dept: SPORTS MEDICINE | Facility: CLINIC | Age: 44
End: 2024-05-23
Payer: COMMERCIAL

## 2024-05-23 NOTE — PROGRESS NOTES
May 23, 2024    A [employer] Message has been sent to the patient for PATIENT ROUNDING with [facility]

## 2024-06-25 ENCOUNTER — HOSPITAL ENCOUNTER (OUTPATIENT)
Dept: MRI IMAGING | Facility: HOSPITAL | Age: 44
Discharge: HOME | End: 2024-06-25
Admitting: FAMILY MEDICINE
Payer: COMMERCIAL

## 2024-06-25 DIAGNOSIS — G89.29 CHRONIC PAIN OF RIGHT ANKLE: ICD-10-CM

## 2024-06-25 DIAGNOSIS — M76.61 INSERTIONAL TENDINOPATHY OF RIGHT ACHILLES TENDON: ICD-10-CM

## 2024-06-25 DIAGNOSIS — M25.571 CHRONIC PAIN OF RIGHT ANKLE: ICD-10-CM

## 2024-06-25 PROCEDURE — 73721 MRI JNT OF LWR EXTRE W/O DYE: CPT

## 2024-10-23 ENCOUNTER — OFFICE VISIT (OUTPATIENT)
Dept: FAMILY MEDICINE CLINIC | Facility: CLINIC | Age: 44
End: 2024-10-23
Payer: COMMERCIAL

## 2024-10-23 VITALS
HEIGHT: 70 IN | TEMPERATURE: 98 F | SYSTOLIC BLOOD PRESSURE: 152 MMHG | BODY MASS INDEX: 29.2 KG/M2 | OXYGEN SATURATION: 98 % | DIASTOLIC BLOOD PRESSURE: 100 MMHG | HEART RATE: 59 BPM | WEIGHT: 204 LBS

## 2024-10-23 DIAGNOSIS — I10 PRIMARY HYPERTENSION: Primary | ICD-10-CM

## 2024-10-23 LAB
ALBUMIN SERPL-MCNC: 4.8 G/DL (ref 3.5–5.2)
ALBUMIN/GLOB SERPL: 1.8 G/DL
ALP SERPL-CCNC: 46 U/L (ref 39–117)
ALT SERPL-CCNC: 29 U/L (ref 1–41)
AST SERPL-CCNC: 24 U/L (ref 1–40)
BASOPHILS # BLD AUTO: 0.11 10*3/MM3 (ref 0–0.2)
BASOPHILS NFR BLD AUTO: 2.3 % (ref 0–1.5)
BILIRUB SERPL-MCNC: 0.7 MG/DL (ref 0–1.2)
BUN SERPL-MCNC: 16 MG/DL (ref 6–20)
BUN/CREAT SERPL: 13.3 (ref 7–25)
CALCIUM SERPL-MCNC: 9.9 MG/DL (ref 8.6–10.5)
CHLORIDE SERPL-SCNC: 103 MMOL/L (ref 98–107)
CHOLEST SERPL-MCNC: 212 MG/DL (ref 0–200)
CO2 SERPL-SCNC: 27.5 MMOL/L (ref 22–29)
CREAT SERPL-MCNC: 1.2 MG/DL (ref 0.76–1.27)
EGFRCR SERPLBLD CKD-EPI 2021: 76.5 ML/MIN/1.73
EOSINOPHIL # BLD AUTO: 0.14 10*3/MM3 (ref 0–0.4)
EOSINOPHIL NFR BLD AUTO: 2.9 % (ref 0.3–6.2)
ERYTHROCYTE [DISTWIDTH] IN BLOOD BY AUTOMATED COUNT: 12.3 % (ref 12.3–15.4)
GLOBULIN SER CALC-MCNC: 2.7 GM/DL
GLUCOSE SERPL-MCNC: 89 MG/DL (ref 65–99)
HCT VFR BLD AUTO: 47.6 % (ref 37.5–51)
HDLC SERPL-MCNC: 45 MG/DL (ref 40–60)
HGB BLD-MCNC: 15.8 G/DL (ref 13–17.7)
IMM GRANULOCYTES # BLD AUTO: 0.01 10*3/MM3 (ref 0–0.05)
IMM GRANULOCYTES NFR BLD AUTO: 0.2 % (ref 0–0.5)
LDLC SERPL CALC-MCNC: 140 MG/DL (ref 0–100)
LYMPHOCYTES # BLD AUTO: 1.77 10*3/MM3 (ref 0.7–3.1)
LYMPHOCYTES NFR BLD AUTO: 36.7 % (ref 19.6–45.3)
MCH RBC QN AUTO: 30.2 PG (ref 26.6–33)
MCHC RBC AUTO-ENTMCNC: 33.2 G/DL (ref 31.5–35.7)
MCV RBC AUTO: 90.8 FL (ref 79–97)
MONOCYTES # BLD AUTO: 0.4 10*3/MM3 (ref 0.1–0.9)
MONOCYTES NFR BLD AUTO: 8.3 % (ref 5–12)
NEUTROPHILS # BLD AUTO: 2.39 10*3/MM3 (ref 1.7–7)
NEUTROPHILS NFR BLD AUTO: 49.6 % (ref 42.7–76)
NRBC BLD AUTO-RTO: 0 /100 WBC (ref 0–0.2)
PLATELET # BLD AUTO: 191 10*3/MM3 (ref 140–450)
POTASSIUM SERPL-SCNC: 4.2 MMOL/L (ref 3.5–5.2)
PROT SERPL-MCNC: 7.5 G/DL (ref 6–8.5)
RBC # BLD AUTO: 5.24 10*6/MM3 (ref 4.14–5.8)
SODIUM SERPL-SCNC: 141 MMOL/L (ref 136–145)
TRIGL SERPL-MCNC: 152 MG/DL (ref 0–150)
TSH SERPL DL<=0.005 MIU/L-ACNC: 2.33 UIU/ML (ref 0.27–4.2)
VLDLC SERPL CALC-MCNC: 27 MG/DL (ref 5–40)
WBC # BLD AUTO: 4.82 10*3/MM3 (ref 3.4–10.8)

## 2024-10-23 PROCEDURE — 99213 OFFICE O/P EST LOW 20 MIN: CPT | Performed by: FAMILY MEDICINE

## 2024-10-23 RX ORDER — LISINOPRIL AND HYDROCHLOROTHIAZIDE 12.5; 2 MG/1; MG/1
1 TABLET ORAL DAILY
Qty: 30 TABLET | Refills: 1 | Status: SHIPPED | OUTPATIENT
Start: 2024-10-23

## 2024-10-23 NOTE — PROGRESS NOTES
"  Subjective   Campbell Mcintosh is a 44 y.o. male who is here for   Chief Complaint   Patient presents with    Hypertension   .     Hypertension  This is a new problem. The current episode started today. The problem is uncontrolled. Pertinent negatives include no anxiety, blurred vision, chest pain or shortness of breath. There are no associated agents to hypertension. Risk factors for coronary artery disease include male gender. Past treatments include nothing.   Additional comments: Patient states he went to the dentist today for regular cleaning.  He states his blood pressure was very elevated with wrist cuff in office.  Patient states he has noticed his blood pressure being high in the past.  Patient states it tends to fluctuate.  Patient is not on any blood pressure medication at this time.  He has never been treated for hypertension prior to today's appointment.     History of Present Illness      Review of Systems   Eyes:  Negative for blurred vision.   Respiratory:  Negative for shortness of breath.    Cardiovascular:  Negative for chest pain.       Objective   Vitals:    10/23/24 0851   BP: 152/100   BP Location: Left arm   Patient Position: Sitting   Cuff Size: Large Adult   Pulse: 59   Temp: 98 °F (36.7 °C)   SpO2: 98%   Weight: 92.5 kg (204 lb)   Height: 177.8 cm (70\")      Physical Exam  Vitals and nursing note reviewed.   Constitutional:       Appearance: Normal appearance. He is normal weight.   HENT:      Head: Normocephalic and atraumatic.   Cardiovascular:      Rate and Rhythm: Normal rate and regular rhythm.      Pulses: Normal pulses.      Heart sounds: No murmur heard.  Pulmonary:      Effort: Pulmonary effort is normal. No respiratory distress.      Breath sounds: Normal breath sounds. No wheezing.   Skin:     General: Skin is warm and dry.   Neurological:      General: No focal deficit present.      Mental Status: He is alert.   Psychiatric:         Mood and Affect: Mood normal.         " Thought Content: Thought content normal.       Physical Exam        Assessment & Plan   Assessment & Plan    Diagnoses and all orders for this visit:    1. Primary hypertension (Primary)  We will add lisinopril hydrochlorothiazide 20/12.5 mg p.o. daily.  Will obtain baseline labs.  Will also have patient's begin checking his blood pressure on a regular basis at home.  Patient is to bring in numbers for next office visit.  -     lisinopril-hydrochlorothiazide (PRINZIDE,ZESTORETIC) 20-12.5 MG per tablet; Take 1 tablet by mouth Daily.  Dispense: 30 tablet; Refill: 1  -     Blood Pressure Device  -     TSH Rfx On Abnormal To Free T4  -     CBC & Differential  -     Lipid Panel  -     Comprehensive Metabolic Panel      Results      There are no Patient Instructions on file for this visit.    There are no discontinued medications.     Return in about 4 weeks (around 11/20/2024), or HTN, for Recheck.           Jonny Roman MD  [place], [place]

## 2024-10-24 NOTE — PROGRESS NOTES
CBC normal.  TSH is normal.  Normal liver and kidney function.  Cholesterol reveals an slightly elevated LDL at 140.  Goal is less than 100.  Patient is to work on diet and exercise.  Will repeat labs in 3 months.

## 2024-11-19 ENCOUNTER — OFFICE VISIT (OUTPATIENT)
Dept: FAMILY MEDICINE CLINIC | Facility: CLINIC | Age: 44
End: 2024-11-19
Payer: COMMERCIAL

## 2024-11-19 VITALS
TEMPERATURE: 97.8 F | HEIGHT: 70 IN | BODY MASS INDEX: 29.2 KG/M2 | HEART RATE: 64 BPM | SYSTOLIC BLOOD PRESSURE: 130 MMHG | WEIGHT: 204 LBS | OXYGEN SATURATION: 98 % | RESPIRATION RATE: 16 BRPM | DIASTOLIC BLOOD PRESSURE: 90 MMHG

## 2024-11-19 DIAGNOSIS — I10 PRIMARY HYPERTENSION: Primary | ICD-10-CM

## 2024-11-19 DIAGNOSIS — N52.03 COMBINED ARTERIAL INSUFFICIENCY AND CORPORO-VENOUS OCCLUSIVE ERECTILE DYSFUNCTION: ICD-10-CM

## 2024-11-19 DIAGNOSIS — E78.2 MODERATE MIXED HYPERLIPIDEMIA NOT REQUIRING STATIN THERAPY: ICD-10-CM

## 2024-11-19 PROCEDURE — 99214 OFFICE O/P EST MOD 30 MIN: CPT | Performed by: FAMILY MEDICINE

## 2024-11-19 RX ORDER — SILDENAFIL CITRATE 20 MG/1
20 TABLET ORAL DAILY PRN
Qty: 30 TABLET | Refills: 1 | Status: SHIPPED | OUTPATIENT
Start: 2024-11-19 | End: 2024-11-25 | Stop reason: SDUPTHER

## 2024-11-19 RX ORDER — LISINOPRIL AND HYDROCHLOROTHIAZIDE 12.5; 2 MG/1; MG/1
1 TABLET ORAL DAILY
Qty: 90 TABLET | Refills: 1 | Status: SHIPPED | OUTPATIENT
Start: 2024-11-19

## 2024-11-19 NOTE — PROGRESS NOTES
Chief Complaint   Patient presents with    Hypertension     Answers submitted by the patient for this visit:  Primary Reason for Visit (Submitted on 11/18/2024)  What is the primary reason for your visit?: High Blood Pressure  High Blood Pressure Questionnaire (Submitted on 11/18/2024)  Chief Complaint: Hypertension  Chronicity: new  Onset: 1 to 4 weeks ago  anxiety: No  blurred vision: No  chest pain: No  headaches: Yes  malaise/fatigue: Yes  orthopnea: No  palpitations: Yes  peripheral edema: No  shortness of breath: No  Agents associated with hypertension: no associated agents  Compliance problems: no compliance problems    Subjective     Patient here for follow-up of elevated blood pressure.    He is exercising and is adherent to a low-salt diet.    Blood pressure is well controlled at home.   Cardiac symptoms: none.   Patient denies: chest pressure/discomfort.   Cardiovascular risk factors: dyslipidemia, hypertension, and male gender.   Use of agents associated with hypertension: none.   History of target organ damage: none.  Patient is taking prescribed hypertension medications as prescribed without side effects.    The following portions of the patient's history were reviewed and updated as appropriate: allergies, current medications, past family history, past medical history, past social history, past surgical history, and problem list.    Review of Systems  Pertinent items are noted in HPI.    Results for orders placed or performed in visit on 10/23/24   TSH Rfx On Abnormal To Free T4    Collection Time: 10/23/24  9:36 AM    Specimen: Blood   Result Value Ref Range    TSH 2.330 0.270 - 4.200 uIU/mL   Lipid Panel    Collection Time: 10/23/24  9:36 AM    Specimen: Blood   Result Value Ref Range    Total Cholesterol 212 (H) 0 - 200 mg/dL    Triglycerides 152 (H) 0 - 150 mg/dL    HDL Cholesterol 45 40 - 60 mg/dL    VLDL Cholesterol Grey 27 5 - 40 mg/dL    LDL Chol Calc (NIH) 140 (H) 0 - 100 mg/dL  "  Comprehensive Metabolic Panel    Collection Time: 10/23/24  9:36 AM    Specimen: Blood   Result Value Ref Range    Glucose 89 65 - 99 mg/dL    BUN 16 6 - 20 mg/dL    Creatinine 1.20 0.76 - 1.27 mg/dL    EGFR Result 76.5 >60.0 mL/min/1.73    BUN/Creatinine Ratio 13.3 7.0 - 25.0    Sodium 141 136 - 145 mmol/L    Potassium 4.2 3.5 - 5.2 mmol/L    Chloride 103 98 - 107 mmol/L    Total CO2 27.5 22.0 - 29.0 mmol/L    Calcium 9.9 8.6 - 10.5 mg/dL    Total Protein 7.5 6.0 - 8.5 g/dL    Albumin 4.8 3.5 - 5.2 g/dL    Globulin 2.7 gm/dL    A/G Ratio 1.8 g/dL    Total Bilirubin 0.7 0.0 - 1.2 mg/dL    Alkaline Phosphatase 46 39 - 117 U/L    AST (SGOT) 24 1 - 40 U/L    ALT (SGPT) 29 1 - 41 U/L   CBC & Differential    Collection Time: 10/23/24  9:36 AM    Specimen: Blood   Result Value Ref Range    WBC 4.82 3.40 - 10.80 10*3/mm3    RBC 5.24 4.14 - 5.80 10*6/mm3    Hemoglobin 15.8 13.0 - 17.7 g/dL    Hematocrit 47.6 37.5 - 51.0 %    MCV 90.8 79.0 - 97.0 fL    MCH 30.2 26.6 - 33.0 pg    MCHC 33.2 31.5 - 35.7 g/dL    RDW 12.3 12.3 - 15.4 %    Platelets 191 140 - 450 10*3/mm3    Neutrophil Rel % 49.6 42.7 - 76.0 %    Lymphocyte Rel % 36.7 19.6 - 45.3 %    Monocyte Rel % 8.3 5.0 - 12.0 %    Eosinophil Rel % 2.9 0.3 - 6.2 %    Basophil Rel % 2.3 (H) 0.0 - 1.5 %    Neutrophils Absolute 2.39 1.70 - 7.00 10*3/mm3    Lymphocytes Absolute 1.77 0.70 - 3.10 10*3/mm3    Monocytes Absolute 0.40 0.10 - 0.90 10*3/mm3    Eosinophils Absolute 0.14 0.00 - 0.40 10*3/mm3    Basophils Absolute 0.11 0.00 - 0.20 10*3/mm3    Immature Granulocyte Rel % 0.2 0.0 - 0.5 %    Immature Grans Absolute 0.01 0.00 - 0.05 10*3/mm3    nRBC 0.0 0.0 - 0.2 /100 WBC        Vitals:    11/19/24 1619 11/19/24 1635   BP: 116/80 130/90   BP Location: Left arm Right arm   Patient Position: Sitting Sitting   Cuff Size: Adult Adult   Pulse: 64    Resp:  16   Temp: 97.8 °F (36.6 °C)    SpO2: 98%    Weight: 92.5 kg (204 lb)    Height: 177.8 cm (70\")      BP Readings from Last 3 " Encounters:   11/19/24 130/90   10/23/24 152/100   05/21/24 118/82     Objective            Gen: alert, pleasant.  Neck: no bruit, no enlarged thyroid  Lungs: CTA  Heart: RR, no murmur  Feet: no edema  Pulses: intact    Assessment & Plan   Hypertension, normal blood pressure Evidence of target organ damage: none.    Diagnoses and all orders for this visit:    1. Primary hypertension (Primary)  -     lisinopril-hydrochlorothiazide (PRINZIDE,ZESTORETIC) 20-12.5 MG per tablet; Take 1 tablet by mouth Daily. Indications: High Blood Pressure  Dispense: 90 tablet; Refill: 1    2. Combined arterial insufficiency and corporo-venous occlusive erectile dysfunction  -     sildenafil (REVATIO) 20 MG tablet; Take 1 tablet by mouth Daily As Needed (ED).  Dispense: 30 tablet; Refill: 1    3. Moderate mixed hyperlipidemia not requiring statin therapy    Josh was placed on blood pressure medicine last time  Taking zestoretic in the  evening, as he is a little lightheaded if he takes it daytime/am  Reviewed blood work.  Cholesterols borderline  Also discussed ED.    Medication: no change.  Dietary sodium restriction.  Regular aerobic exercise.  Follow up: 6 months and as needed.    There are no Patient Instructions on file for this visit.  Medications Discontinued During This Encounter   Medication Reason    lisinopril-hydrochlorothiazide (PRINZIDE,ZESTORETIC) 20-12.5 MG per tablet Reorder        No follow-ups on file.    Limit salt  Limit alcoholic drinks to 1 a day  Limit caffeine to 1-2 servings a day    Dr. Freddy Talley MD  [place], [place]  [facility].

## 2024-11-25 DIAGNOSIS — N52.03 COMBINED ARTERIAL INSUFFICIENCY AND CORPORO-VENOUS OCCLUSIVE ERECTILE DYSFUNCTION: ICD-10-CM

## 2024-11-25 RX ORDER — SILDENAFIL CITRATE 20 MG/1
20 TABLET ORAL DAILY PRN
Qty: 90 TABLET | Refills: 0 | Status: SHIPPED | OUTPATIENT
Start: 2024-11-25

## 2024-12-04 ENCOUNTER — TELEPHONE (OUTPATIENT)
Dept: FAMILY MEDICINE CLINIC | Facility: CLINIC | Age: 44
End: 2024-12-04

## 2024-12-04 NOTE — TELEPHONE ENCOUNTER
Was addressed on 11/22/24 with pharmacy. No PA to complete pharmacy applied prescription savings card

## 2024-12-04 NOTE — TELEPHONE ENCOUNTER
Caller: [facility] - [place], [place] - [address] [phone] [facility] [phone]     Relationship to patient: Pharmacy      Best call back number: [phone]    Provider: DR LICONA    Medication PA needed:    sildenafil (REVATIO) 20 MG tablet

## 2024-12-17 DIAGNOSIS — I10 PRIMARY HYPERTENSION: ICD-10-CM

## 2024-12-17 RX ORDER — LISINOPRIL AND HYDROCHLOROTHIAZIDE 12.5; 2 MG/1; MG/1
1 TABLET ORAL DAILY
Qty: 90 TABLET | Refills: 1 | Status: SHIPPED | OUTPATIENT
Start: 2024-12-17

## 2024-12-17 NOTE — TELEPHONE ENCOUNTER
"Caller: Campbell Mcintosh \"MARYANN\"    Relationship: Self    Best call back number: [phone]     Requested Prescriptions:   Requested Prescriptions     Pending Prescriptions Disp Refills    lisinopril-hydrochlorothiazide (PRINZIDE,ZESTORETIC) 20-12.5 MG per tablet 90 tablet 1     Sig: Take 1 tablet by mouth Daily. Indications: High Blood Pressure        Pharmacy where request should be sent: [id number] - [place] [address] AT [facility] [address] - [phone]  - [phone] FX     Last office visit with prescribing clinician: 11/19/2024   Last telemedicine visit with prescribing clinician: Visit date not found   Next office visit with prescribing clinician: 5/20/2025     Additional details provided by patient: 90 DAY SUPPLY NEEDED SWITCHED TO [facility]    Does the patient have less than a 3 day supply:  [] Yes  [x] No    Would you like a call back once the refill request has been completed: [] Yes [x] No    If the office needs to give you a call back, can they leave a voicemail: [] Yes [x] No    Ken Duque Rep   12/17/24 14:37 EST   "

## 2025-05-13 DIAGNOSIS — I10 PRIMARY HYPERTENSION: Primary | ICD-10-CM

## 2025-05-13 DIAGNOSIS — R53.83 FATIGUE, UNSPECIFIED TYPE: ICD-10-CM

## 2025-05-13 DIAGNOSIS — E78.2 MODERATE MIXED HYPERLIPIDEMIA NOT REQUIRING STATIN THERAPY: ICD-10-CM

## 2025-05-14 DIAGNOSIS — E78.2 MODERATE MIXED HYPERLIPIDEMIA NOT REQUIRING STATIN THERAPY: ICD-10-CM

## 2025-05-14 DIAGNOSIS — R53.83 FATIGUE, UNSPECIFIED TYPE: ICD-10-CM

## 2025-05-14 DIAGNOSIS — I10 PRIMARY HYPERTENSION: ICD-10-CM

## 2025-05-20 LAB
ALBUMIN SERPL-MCNC: 5 G/DL (ref 4.1–5.1)
ALP SERPL-CCNC: 45 IU/L (ref 44–121)
ALT SERPL-CCNC: 33 IU/L (ref 0–44)
AST SERPL-CCNC: 27 IU/L (ref 0–40)
BILIRUB SERPL-MCNC: 1 MG/DL (ref 0–1.2)
BUN SERPL-MCNC: 20 MG/DL (ref 6–24)
BUN/CREAT SERPL: 16 (ref 9–20)
CALCIUM SERPL-MCNC: 10.2 MG/DL (ref 8.7–10.2)
CHLORIDE SERPL-SCNC: 100 MMOL/L (ref 96–106)
CHOLEST SERPL-MCNC: 215 MG/DL (ref 100–199)
CO2 SERPL-SCNC: 20 MMOL/L (ref 20–29)
CREAT SERPL-MCNC: 1.28 MG/DL (ref 0.76–1.27)
EGFRCR SERPLBLD CKD-EPI 2021: 71 ML/MIN/1.73
ERYTHROCYTE [DISTWIDTH] IN BLOOD BY AUTOMATED COUNT: 12.4 % (ref 11.6–15.4)
GLOBULIN SER CALC-MCNC: 2.5 G/DL (ref 1.5–4.5)
GLUCOSE SERPL-MCNC: 89 MG/DL (ref 70–99)
HCT VFR BLD AUTO: 48 % (ref 37.5–51)
HDLC SERPL-MCNC: 41 MG/DL
HGB BLD-MCNC: 16 G/DL (ref 13–17.7)
LDLC SERPL CALC-MCNC: 141 MG/DL (ref 0–99)
LDLC/HDLC SERPL: 3.4 RATIO (ref 0–3.6)
MCH RBC QN AUTO: 30.5 PG (ref 26.6–33)
MCHC RBC AUTO-ENTMCNC: 33.3 G/DL (ref 31.5–35.7)
MCV RBC AUTO: 92 FL (ref 79–97)
PLATELET # BLD AUTO: 185 X10E3/UL (ref 150–450)
POTASSIUM SERPL-SCNC: 4.8 MMOL/L (ref 3.5–5.2)
PROT SERPL-MCNC: 7.5 G/DL (ref 6–8.5)
RBC # BLD AUTO: 5.24 X10E6/UL (ref 4.14–5.8)
SODIUM SERPL-SCNC: 140 MMOL/L (ref 134–144)
TESTOST FREE SERPL-MCNC: 8.2 PG/ML (ref 6.8–21.5)
TESTOST SERPL-MCNC: 523.5 NG/DL (ref 264–916)
TRIGL SERPL-MCNC: 181 MG/DL (ref 0–149)
UNABLE TO VOID: NORMAL
VLDLC SERPL CALC-MCNC: 33 MG/DL (ref 5–40)
WBC # BLD AUTO: 5.6 X10E3/UL (ref 3.4–10.8)

## 2025-06-23 ENCOUNTER — OFFICE VISIT (OUTPATIENT)
Dept: FAMILY MEDICINE CLINIC | Facility: CLINIC | Age: 45
End: 2025-06-23
Payer: COMMERCIAL

## 2025-06-23 VITALS
SYSTOLIC BLOOD PRESSURE: 118 MMHG | HEIGHT: 70 IN | DIASTOLIC BLOOD PRESSURE: 86 MMHG | OXYGEN SATURATION: 99 % | HEART RATE: 66 BPM | TEMPERATURE: 97.3 F | BODY MASS INDEX: 29.54 KG/M2 | RESPIRATION RATE: 16 BRPM | WEIGHT: 206.3 LBS

## 2025-06-23 DIAGNOSIS — I10 PRIMARY HYPERTENSION: ICD-10-CM

## 2025-06-23 PROCEDURE — 99213 OFFICE O/P EST LOW 20 MIN: CPT | Performed by: FAMILY MEDICINE

## 2025-06-23 RX ORDER — LISINOPRIL AND HYDROCHLOROTHIAZIDE 12.5; 2 MG/1; MG/1
1 TABLET ORAL DAILY
Qty: 90 TABLET | Refills: 3 | Status: SHIPPED | OUTPATIENT
Start: 2025-06-23

## 2025-06-23 NOTE — PROGRESS NOTES
Chief Complaint   Patient presents with    Hypertension    Hyperlipidemia       Subjective     Patient here for follow-up of elevated blood pressure.    He is not exercising and is not adherent to a low-salt diet.    Blood pressure is well controlled at home.   Cardiac symptoms: none.   Patient denies: chest pressure/discomfort.   Cardiovascular risk factors: dyslipidemia, hypertension, and male gender.   Use of agents associated with hypertension: none.   History of target organ damage: none.  Patient is taking prescribed hypertension medications as prescribed without side effects.    The following portions of the patient's history were reviewed and updated as appropriate: allergies, current medications, past family history, past medical history, past social history, past surgical history, and problem list.    Review of Systems  Pertinent items are noted in HPI.    Results for orders placed or performed in visit on 05/14/25   Testosterone (Free & Total), LC / MS    Collection Time: 05/15/25 11:17 AM    Specimen: Blood   Result Value Ref Range    Testosterone, Total 523.5 264.0 - 916.0 ng/dL    Testosterone, Free 8.2 6.8 - 21.5 pg/mL   Lipid Panel With LDL/HDL Ratio    Collection Time: 05/15/25 11:17 AM    Specimen: Blood   Result Value Ref Range    Total Cholesterol 215 (H) 100 - 199 mg/dL    Triglycerides 181 (H) 0 - 149 mg/dL    HDL Cholesterol 41 >39 mg/dL    VLDL Cholesterol Grey 33 5 - 40 mg/dL    LDL Chol Calc (NIH) 141 (H) 0 - 99 mg/dL    LDL/HDL RATIO 3.4 0.0 - 3.6 ratio   Comprehensive metabolic panel    Collection Time: 05/15/25 11:17 AM    Specimen: Blood   Result Value Ref Range    Glucose 89 70 - 99 mg/dL    BUN 20 6 - 24 mg/dL    Creatinine 1.28 (H) 0.76 - 1.27 mg/dL    EGFR Result 71 >59 mL/min/1.73    BUN/Creatinine Ratio 16 9 - 20    Sodium 140 134 - 144 mmol/L    Potassium 4.8 3.5 - 5.2 mmol/L    Chloride 100 96 - 106 mmol/L    Total CO2 20 20 - 29 mmol/L    Calcium 10.2 8.7 - 10.2 mg/dL    Total  "Protein 7.5 6.0 - 8.5 g/dL    Albumin 5.0 4.1 - 5.1 g/dL    Globulin 2.5 1.5 - 4.5 g/dL    Total Bilirubin 1.0 0.0 - 1.2 mg/dL    Alkaline Phosphatase 45 44 - 121 IU/L    AST (SGOT) 27 0 - 40 IU/L    ALT (SGPT) 33 0 - 44 IU/L   CBC (No Diff)    Collection Time: 05/15/25 11:17 AM    Specimen: Blood   Result Value Ref Range    WBC 5.6 3.4 - 10.8 x10E3/uL    RBC 5.24 4.14 - 5.80 x10E6/uL    Hemoglobin 16.0 13.0 - 17.7 g/dL    Hematocrit 48.0 37.5 - 51.0 %    MCV 92 79 - 97 fL    MCH 30.5 26.6 - 33.0 pg    MCHC 33.3 31.5 - 35.7 g/dL    RDW 12.4 11.6 - 15.4 %    Platelets 185 150 - 450 x10E3/uL   Unable To Void    Collection Time: 05/15/25 11:17 AM   Result Value Ref Range    Unable to Void Comment         Vitals:    06/23/25 0759   BP: 118/86   Pulse: 66   Resp: 16   Temp: 97.3 °F (36.3 °C)   TempSrc: Infrared   SpO2: 99%   Weight: 93.6 kg (206 lb 4.8 oz)   Height: 177.8 cm (70\")     BP Readings from Last 3 Encounters:   06/23/25 118/86   11/19/24 130/90   10/23/24 152/100     Objective    BMI is >= 25 and <30. (Overweight) The following options were offered after discussion;: nutrition counseling/recommendations       Gen: alert, pleasant.  Neck: no bruit, no enlarged thyroid  Lungs: CTA  Heart: RR, no murmur  Feet: no edema  Pulses: intact    Assessment & Plan   Hypertension, normal blood pressure Evidence of target organ damage: none.    Diagnoses and all orders for this visit:    1. Primary hypertension  -     lisinopril-hydrochlorothiazide (PRINZIDE,ZESTORETIC) 20-12.5 MG per tablet; Take 1 tablet by mouth Daily. Indications: High Blood Pressure  Dispense: 90 tablet; Refill: 3    Josh overall doing well  Blood pressure is well-controlled  Labs reviewed  We wanted to add on a testosterone panel it is normal  Sildenafil working well    Medication: no change.  Follow up: 6 months and as needed.    There are no Patient Instructions on file for this visit.  Medications Discontinued During This Encounter   Medication " Reason    lisinopril-hydrochlorothiazide (PRINZIDE,ZESTORETIC) 20-12.5 MG per tablet Reorder        Return in about 6 months (around 12/23/2025) for blood pressure, Annual physical.    Limit salt  Limit alcoholic drinks to 1 a day  Limit caffeine to 1-2 servings a day    Dr. Freddy Talley MD  [place], [place]  [facility].